# Patient Record
Sex: MALE | Race: WHITE | NOT HISPANIC OR LATINO | Employment: OTHER | URBAN - NONMETROPOLITAN AREA
[De-identification: names, ages, dates, MRNs, and addresses within clinical notes are randomized per-mention and may not be internally consistent; named-entity substitution may affect disease eponyms.]

---

## 2017-01-04 DIAGNOSIS — M10.9 ACUTE GOUT OF MULTIPLE SITES, UNSPECIFIED CAUSE: ICD-10-CM

## 2017-01-05 RX ORDER — MELOXICAM 7.5 MG/1
TABLET ORAL
Qty: 30 TABLET | Refills: 0 | OUTPATIENT
Start: 2017-01-05

## 2017-01-05 RX ORDER — ALLOPURINOL 100 MG/1
TABLET ORAL
Qty: 30 TABLET | Refills: 1 | Status: SHIPPED | OUTPATIENT
Start: 2017-01-05 | End: 2017-03-05 | Stop reason: SDUPTHER

## 2017-01-19 ENCOUNTER — OFFICE VISIT (OUTPATIENT)
Dept: FAMILY MEDICINE CLINIC | Facility: CLINIC | Age: 47
End: 2017-01-19

## 2017-01-19 VITALS
SYSTOLIC BLOOD PRESSURE: 111 MMHG | HEART RATE: 113 BPM | RESPIRATION RATE: 16 BRPM | OXYGEN SATURATION: 94 % | HEIGHT: 71 IN | TEMPERATURE: 98.3 F | BODY MASS INDEX: 25.48 KG/M2 | WEIGHT: 182 LBS | DIASTOLIC BLOOD PRESSURE: 61 MMHG

## 2017-01-19 DIAGNOSIS — I95.1 ORTHOSTATIC HYPOTENSION: ICD-10-CM

## 2017-01-19 DIAGNOSIS — J40 BRONCHITIS: Primary | ICD-10-CM

## 2017-01-19 LAB
ALBUMIN SERPL-MCNC: 4.2 G/DL (ref 3.2–4.8)
ALBUMIN/GLOB SERPL: 1.2 G/DL (ref 1.5–2.5)
ALP SERPL-CCNC: 89 U/L (ref 25–100)
ALT SERPL W P-5'-P-CCNC: 104 U/L (ref 7–40)
ANION GAP SERPL CALCULATED.3IONS-SCNC: 17 MMOL/L (ref 3–11)
AST SERPL-CCNC: 185 U/L (ref 0–33)
BASOPHILS # BLD AUTO: 0.02 10*3/MM3 (ref 0–0.2)
BASOPHILS NFR BLD AUTO: 0.2 % (ref 0–1)
BILIRUB SERPL-MCNC: 0.5 MG/DL (ref 0.3–1.2)
BUN BLD-MCNC: 34 MG/DL (ref 9–23)
BUN/CREAT SERPL: 28.3 (ref 7–25)
CALCIUM SPEC-SCNC: 10 MG/DL (ref 8.7–10.4)
CHLORIDE SERPL-SCNC: 95 MMOL/L (ref 99–109)
CO2 SERPL-SCNC: 27 MMOL/L (ref 20–31)
CREAT BLD-MCNC: 1.2 MG/DL (ref 0.6–1.3)
DEPRECATED RDW RBC AUTO: 45.3 FL (ref 37–54)
EOSINOPHIL # BLD AUTO: 0.25 10*3/MM3 (ref 0.1–0.3)
EOSINOPHIL NFR BLD AUTO: 2 % (ref 0–3)
ERYTHROCYTE [DISTWIDTH] IN BLOOD BY AUTOMATED COUNT: 13.9 % (ref 11.3–14.5)
GFR SERPL CREATININE-BSD FRML MDRD: 65 ML/MIN/1.73
GLOBULIN UR ELPH-MCNC: 3.4 GM/DL
GLUCOSE BLD-MCNC: 92 MG/DL (ref 70–100)
HCT VFR BLD AUTO: 40.2 % (ref 38.9–50.9)
HGB BLD-MCNC: 13.5 G/DL (ref 13.1–17.5)
IMM GRANULOCYTES # BLD: 0.13 10*3/MM3 (ref 0–0.03)
IMM GRANULOCYTES NFR BLD: 1 % (ref 0–0.6)
LYMPHOCYTES # BLD AUTO: 2.07 10*3/MM3 (ref 0.6–4.8)
LYMPHOCYTES NFR BLD AUTO: 16.2 % (ref 24–44)
MCH RBC QN AUTO: 30.5 PG (ref 27–31)
MCHC RBC AUTO-ENTMCNC: 33.6 G/DL (ref 32–36)
MCV RBC AUTO: 90.7 FL (ref 80–99)
MONOCYTES # BLD AUTO: 1.37 10*3/MM3 (ref 0–1)
MONOCYTES NFR BLD AUTO: 10.7 % (ref 0–12)
NEUTROPHILS # BLD AUTO: 8.97 10*3/MM3 (ref 1.5–8.3)
NEUTROPHILS NFR BLD AUTO: 69.9 % (ref 41–71)
PLAT MORPH BLD: NORMAL
PLATELET # BLD AUTO: 371 10*3/MM3 (ref 150–450)
PMV BLD AUTO: 9.5 FL (ref 6–12)
POTASSIUM BLD-SCNC: 4 MMOL/L (ref 3.5–5.5)
PROT SERPL-MCNC: 7.6 G/DL (ref 5.7–8.2)
RBC # BLD AUTO: 4.43 10*6/MM3 (ref 4.2–5.76)
RBC MORPH BLD: NORMAL
SODIUM BLD-SCNC: 139 MMOL/L (ref 132–146)
T4 FREE SERPL-MCNC: 1.48 NG/DL (ref 0.89–1.76)
TSH SERPL DL<=0.05 MIU/L-ACNC: 2.39 MIU/ML (ref 0.35–5.35)
VIT B12 BLD-MCNC: 811 PG/ML (ref 211–911)
WBC MORPH BLD: NORMAL
WBC NRBC COR # BLD: 12.81 10*3/MM3 (ref 3.5–10.8)

## 2017-01-19 PROCEDURE — 82607 VITAMIN B-12: CPT | Performed by: INTERNAL MEDICINE

## 2017-01-19 PROCEDURE — 84439 ASSAY OF FREE THYROXINE: CPT | Performed by: INTERNAL MEDICINE

## 2017-01-19 PROCEDURE — 80050 GENERAL HEALTH PANEL: CPT | Performed by: INTERNAL MEDICINE

## 2017-01-19 PROCEDURE — 85007 BL SMEAR W/DIFF WBC COUNT: CPT | Performed by: INTERNAL MEDICINE

## 2017-01-19 PROCEDURE — 93000 ELECTROCARDIOGRAM COMPLETE: CPT | Performed by: INTERNAL MEDICINE

## 2017-01-19 PROCEDURE — 99214 OFFICE O/P EST MOD 30 MIN: CPT | Performed by: INTERNAL MEDICINE

## 2017-01-19 RX ORDER — DOXYCYCLINE 100 MG/1
100 CAPSULE ORAL 2 TIMES DAILY
Qty: 20 CAPSULE | Refills: 0 | Status: SHIPPED | OUTPATIENT
Start: 2017-01-19 | End: 2017-10-17

## 2017-01-19 NOTE — PROGRESS NOTES
"Subjective   Patient ID: Matthew Santana is a 46 y.o. male Pt is here for management of multiple medical problems.  History of Present Illness  Pt is here for management of multiple medical problems.  Pt is sick. Chest congestion and soa. Started Monday night. Had Doxycycline 4 pills and took till tueday maybe. Pt having problems with hx. States he feels like lights flickering.   Cough with sputum.    Stopped smoking in July of 2016.     Not taking otc meds.       The following portions of the patient's history were reviewed and updated as appropriate: allergies, current medications, past family history, past medical history, past social history, past surgical history and problem list.  Review of Systems   Constitutional: Positive for fatigue.   HENT: Positive for congestion, sinus pressure and sore throat.    Respiratory: Positive for cough, shortness of breath and wheezing.    All other systems reviewed and are negative.      Objective     Visit Vitals   • /61 (BP Location: Right arm, Patient Position: Standing, Cuff Size: Adult)   • Pulse 113   • Temp 98.3 °F (36.8 °C) (Oral)   • Resp 16   • Ht 71\" (180.3 cm)   • Wt 182 lb (82.6 kg)   • SpO2 94%   • BMI 25.38 kg/m2     Physical Exam  General Appearance:    Alert, cooperative, no distress, appears stated age   Head:    Normocephalic, without obvious abnormality, atraumatic   Eyes:    PERRL, conjunctiva/corneas clear, EOM's intact           Ears:    Normal TM's and external ear canals, both ears   Nose:   Nares normal, septum midline, mucosa normal, no drainage    or sinus tenderness   Throat:   Lips, mucosa, and tongue normal; teeth and gums normal   Neck:   Supple, symmetrical, trachea midline, no adenopathy;        thyroid:  No enlargement/tenderness/nodules; no carotid    bruit or JVD   Back:     Symmetric, no curvature, ROM normal, no CVA tenderness   Lungs:     Clear to auscultation bilaterally, respirations unlabored   Chest wall:    No " tenderness or deformity   Heart:    Regular rate and rhythm, S1 and S2 normal, no murmur, rub   or gallop   Abdomen:     Soft, non-tender, bowel sounds active all four quadrants,     no masses, no organomegaly           Extremities:   Extremities normal, atraumatic, no cyanosis or edema   Pulses:   2+ and symmetric all extremities   Skin:   Skin color, texture, turgor normal, no rashes or lesions   Lymph nodes:   Cervical, supraclavicular, and axillary nodes normal   Neurologic:   CNII-XII intact. Normal strength, sensation and reflexes       throughout       Assessment/Plan     Pt was more symptomatic with dizzyness on lying down. ekg tachy. Ortho's suggest orthosatic hypotension.  Obtain labs. Pt wants to hold hospitalization if possible.   Hold lisinopril hctz for today and restart 1/2 pill a day when feeling better.      Pt is now with clarity of thought while asking me to set with ptc. Refuse elías. He will have to take this up with his surg that is working on this.  I will address his acute issue only today as I'm working to keep him alive and out of the hospital.      D/c mobic. Off Eliquis.           ECG 12 Lead  Date/Time: 1/19/2017 9:31 AM  Performed by: RE BAE  Authorized by: RE BAE   Rhythm: sinus tachycardia  Clinical impression: normal ECG              Re was seen today for shortness of breath, cough, nasal congestion, bodyaches and ears congested.    Diagnoses and all orders for this visit:    Bronchitis    Orthostatic hypotension    Other orders  -     ECG 12 Lead    Return in about 1 week (around 1/26/2017).                   There are no Patient Instructions on file for this visit.

## 2017-01-19 NOTE — MR AVS SNAPSHOT
Matthew Santana   1/19/2017 8:00 AM   Office Visit    Dept Phone:  [phone]   Encounter #:  [id number]    Provider:  Matthew Bloom MD   Department:  [facility] PRIMARY CARE                Your Full Care Plan              Today's Medication Changes          These changes are accurate as of: 1/19/17 10:06 AM.  If you have any questions, ask your nurse or doctor.               Stop taking medication(s)listed here:     ELIQUIS 5 MG tablet tablet   Generic drug:  apixaban   Stopped by:  Matthew Bloom MD           meloxicam 7.5 MG tablet   Commonly known as:  MOBIC   Stopped by:  Matthew Bloom MD           oxyCODONE-acetaminophen  MG per tablet   Commonly known as:  PERCOCET   Stopped by:  Matthew Bloom MD           oxyCODONE-acetaminophen 7.5-325 MG per tablet   Commonly known as:  PERCOCET   Stopped by:  Matthew Bloom MD                Where to Get Your Medications      These medications were sent to [address] [phone]  - [phone] [address] [address]     Phone:  [phone]     doxycycline 100 MG capsule                  Your Updated Medication List          This list is accurate as of: 1/19/17 10:06 AM.  Always use your most recent med list.                allopurinol 100 MG tablet   Commonly known as:  ZYLOPRIM   take 1 tablet by mouth once daily       atenolol 25 MG tablet   Commonly known as:  TENORMIN   Take 1 tablet by mouth Daily.       doxycycline 100 MG capsule   Commonly known as:  MONODOX   Take 1 capsule by mouth 2 (Two) Times a Day.       gabapentin 800 MG tablet   Commonly known as:  NEURONTIN   Take 1 tablet by mouth 4 (four) times a day.       HYDROcodone-acetaminophen 7.5-325 MG per tablet   Commonly known as:  NORCO   Take 1 tablet by mouth every 6 (six) hours as needed for moderate pain (4-6).       hydrOXYzine 10 MG tablet   Commonly known as:  ATARAX   take 1 to 2 tablets by mouth at bedtime if needed for insomnia       levETIRAcetam 500 MG tablet   Commonly known as:  KEPPRA   Take 1 tablet by mouth 2 (two) times a day.       lisinopril-hydrochlorothiazide 20-25 MG per tablet   Commonly known as:  PRINZIDE,ZESTORETIC   Take 1 tablet by mouth Daily.       LORazepam 0.5 MG tablet   Commonly known as:  ATIVAN   Take 1 tablet by mouth every 8 (eight) hours as needed (muscle cramp or spasm).       neomycin-bacitracin-polymyxin-hydrocortisone 1 % ophthalmic ointment   Commonly known as:  CORTISPORIN   Administer  to both eyes 4 (four) times a day.       pantoprazole 40 MG EC tablet   Commonly known as:  PROTONIX   take 1 tablet by mouth once daily       RA BALANCED B-50 tablet       RA COL-RITE 100 MG capsule   Generic drug:  docusate sodium       tiZANidine 4 MG tablet   Commonly known as:  ZANAFLEX   Take 1 tablet by mouth Every 6 (Six) Hours As Needed for muscle spasms.       zolpidem 10 MG tablet   Commonly known as:  AMBIEN   Take 1 tablet by mouth at night as needed for sleep.               We Performed the Following     CBC & Differential     CBC Auto Differential     Comprehensive Metabolic Panel     ECG 12 Lead     T4, Free     TSH     Vitamin B12       You Were Diagnosed With        Codes Comments    Bronchitis    -  Primary ICD-10-CM: J40  ICD-9-CM: 490     Orthostatic hypotension     ICD-10-CM: I95.1  ICD-9-CM: 458.0       Medications to be Given to You by a Medical Professional       Instructions     None    Patient Instructions History      Upcoming Appointments     Visit Type Date Time Department    OFFICE VISIT 1/19/2017  8:00 AM [facility]      [employer] Signup     [facility] [employer] allows you to send messages to your doctor, view your test results, renew your prescriptions, schedule appointments, and more. To sign up, go to [employer] and click on the Sign Up Now link  "in the New User? box. Enter your [employer] Activation Code exactly as it appears below along with the last four digits of your Social Security Number and your Date of Birth () to complete the sign-up process. If you do not sign up before the expiration date, you must request a new code.    [employer] Activation Code: [email or web address]  Expires: 2017 10:06 AM    If you have questions, you can email [email or web address] or call [phone] to talk to our [employer] staff. Remember, [employer] is NOT to be used for urgent needs. For medical emergencies, dial 911.               Other Info from Your Visit           Other Notes About Your Plan     I received a phone call from the patient's home health care nurse today on 2016.  She states patient a told her a unit taken 4-5 pain pills as well as 4-5 Neurontin and 45 of his Zanaflex.  She states her discharging him from care secondary to him overdosing on the medications        Allergies     Bactrim [Sulfamethoxazole-trimethoprim]      Levaquin [Levofloxacin]      Other        Reason for Visit     Shortness of Breath difficulty breathing    Cough chest hurts when coughing    Nasal Congestion yellow/greeen drainage    bodyaches all over    Ears congested ears feel full      Vital Signs     Blood Pressure Pulse Temperature Respirations Height Weight    111/61 (BP Location: Right arm, Patient Position: Standing, Cuff Size: Adult) 113 98.3 °F (36.8 °C) (Oral) 16 71\" (180.3 cm) 182 lb (82.6 kg)    Oxygen Saturation Body Mass Index Smoking Status             94% 25.38 kg/m2 Former Smoker         Problems and Diagnoses Noted     Bronchitis    -  Primary    Blood pressure drop upon sitting or standing          Results     ECG 12 Lead               "

## 2017-01-20 DIAGNOSIS — R74.8 ELEVATED LIVER ENZYMES: Primary | ICD-10-CM

## 2017-01-20 NOTE — PROGRESS NOTES
Please let them know the renal function is improved and normal.  Liver enzymes are elevated. Come in for repeat labs and also some will call with getting an u/s of the liver. Get all this done prior to rtc.

## 2017-01-26 ENCOUNTER — TELEPHONE (OUTPATIENT)
Dept: FAMILY MEDICINE CLINIC | Facility: CLINIC | Age: 47
End: 2017-01-26

## 2017-01-26 ENCOUNTER — HOSPITAL ENCOUNTER (OUTPATIENT)
Dept: ULTRASOUND IMAGING | Facility: HOSPITAL | Age: 47
Discharge: HOME | End: 2017-01-26
Attending: INTERNAL MEDICINE | Admitting: INTERNAL MEDICINE

## 2017-01-26 ENCOUNTER — OFFICE VISIT (OUTPATIENT)
Dept: FAMILY MEDICINE CLINIC | Facility: CLINIC | Age: 47
End: 2017-01-26

## 2017-01-26 VITALS
HEART RATE: 92 BPM | RESPIRATION RATE: 16 BRPM | TEMPERATURE: 97.7 F | WEIGHT: 179 LBS | BODY MASS INDEX: 25.06 KG/M2 | HEIGHT: 71 IN | DIASTOLIC BLOOD PRESSURE: 67 MMHG | OXYGEN SATURATION: 99 % | SYSTOLIC BLOOD PRESSURE: 92 MMHG

## 2017-01-26 DIAGNOSIS — R74.8 ELEVATED LIVER ENZYMES: ICD-10-CM

## 2017-01-26 DIAGNOSIS — R74.8 ELEVATED LIVER ENZYMES: Primary | ICD-10-CM

## 2017-01-26 DIAGNOSIS — I95.1 ORTHOSTATIC HYPOTENSION: ICD-10-CM

## 2017-01-26 DIAGNOSIS — J40 BRONCHITIS: ICD-10-CM

## 2017-01-26 LAB
HAV IGM SERPL QL IA: ABNORMAL
HBV CORE IGM SERPL QL IA: ABNORMAL
HBV SURFACE AG SERPL QL IA: ABNORMAL
HCV AB SER DONR QL: REACTIVE
IRON 24H UR-MRATE: 39 MCG/DL (ref 50–175)
IRON SATN MFR SERPL: 12 % (ref 20–50)
TIBC SERPL-MCNC: 316 MCG/DL (ref 250–450)

## 2017-01-26 PROCEDURE — 83550 IRON BINDING TEST: CPT | Performed by: INTERNAL MEDICINE

## 2017-01-26 PROCEDURE — 99213 OFFICE O/P EST LOW 20 MIN: CPT | Performed by: INTERNAL MEDICINE

## 2017-01-26 PROCEDURE — 76705 ECHO EXAM OF ABDOMEN: CPT

## 2017-01-26 PROCEDURE — 83540 ASSAY OF IRON: CPT | Performed by: INTERNAL MEDICINE

## 2017-01-26 PROCEDURE — 87522 HEPATITIS C REVRS TRNSCRPJ: CPT | Performed by: INTERNAL MEDICINE

## 2017-01-26 PROCEDURE — 80074 ACUTE HEPATITIS PANEL: CPT | Performed by: INTERNAL MEDICINE

## 2017-01-26 NOTE — PROGRESS NOTES
"Subjective   Patient ID: Matthew Santana is a 46 y.o. male Pt is here for management of multiple medical problems.  History of Present Illness  Pt is here for management of multiple medical problems.    Pt just taking 1/2 of lisinopril hctz.  Hot and cold chills. Body aching.   Cough and congestion. Feels raw in chest. Poor apitite.       The following portions of the patient's history were reviewed and updated as appropriate: allergies, current medications, past family history, past medical history, past social history, past surgical history and problem list.      Review of Systems   Constitutional: Positive for chills, fatigue and fever.   Respiratory: Positive for cough, shortness of breath and wheezing.    Gastrointestinal: Positive for abdominal pain and nausea.       Objective     Visit Vitals   • BP 92/67 (BP Location: Right arm, Patient Position: Sitting, Cuff Size: Adult)   • Pulse 92   • Temp 97.7 °F (36.5 °C) (Oral)   • Resp 16   • Ht 71\" (180.3 cm)   • Wt 179 lb (81.2 kg)   • SpO2 99%   • BMI 24.97 kg/m2     Physical Exam  General Appearance:    Alert, cooperative, no distress, appears stated age   Head:    Normocephalic, without obvious abnormality, atraumatic   Eyes:    PERRL, conjunctiva/corneas clear, EOM's intact           Ears:    Normal TM's and external ear canals, both ears   Nose:   Nares normal, septum midline, mucosa normal, no drainage    or sinus tenderness   Throat:   Lips, mucosa, and tongue normal; teeth and gums normal   Neck:   Supple, symmetrical, trachea midline, no adenopathy;        thyroid:  No enlargement/tenderness/nodules; no carotid    bruit or JVD   Back:     Symmetric, no curvature, ROM normal, no CVA tenderness   Lungs:     Clear to auscultation bilaterally, respirations unlabored   Chest wall:    No tenderness or deformity   Heart:    Regular rate and rhythm, S1 and S2 normal, no murmur, rub   or gallop   Abdomen:     Soft, non-tender, bowel sounds active all four " quadrants,     no masses, no organomegaly           Extremities:   Left bka.    Pulses:   2+ and symmetric all extremities   Skin:   Skin color, texture, turgor normal, no rashes or lesions   Lymph nodes:   Cervical, supraclavicular, and axillary nodes normal   Neurologic:   CNII-XII intact. Normal strength, sensation and reflexes       throughout       Assessment/Plan   Pt found to high elevated liver enzymes not yet had repeat blood work.  Us done this am.     Hold on lisinopril hctz.         Matthew was seen today for hypotension and bronchitis.    Diagnoses and all orders for this visit:    Elevated liver enzymes    Bronchitis    Orthostatic hypotension    No Follow-up on file.                   There are no Patient Instructions on file for this visit.

## 2017-01-26 NOTE — TELEPHONE ENCOUNTER
[facility] called to notify Dr. Bloom that patient is positive for Hep. C.  They will send confirmation.

## 2017-01-26 NOTE — MR AVS SNAPSHOT
Matthew Santana   1/26/2017 9:15 AM   Office Visit    Dept Phone:  [phone]   Encounter #:  [id number]    Provider:  Matthew Bloom MD   Department:  [facility] PRIMARY CARE                Your Full Care Plan              Today's Medication Changes          These changes are accurate as of: 1/26/17  9:34 AM.  If you have any questions, ask your nurse or doctor.               Stop taking medication(s)listed here:     lisinopril-hydrochlorothiazide 20-25 MG per tablet   Commonly known as:  PRINZIDE,ZESTORETIC   Stopped by:  Matthew Bloom MD                      Your Updated Medication List          This list is accurate as of: 1/26/17  9:34 AM.  Always use your most recent med list.                allopurinol 100 MG tablet   Commonly known as:  ZYLOPRIM   take 1 tablet by mouth once daily       atenolol 25 MG tablet   Commonly known as:  TENORMIN   Take 1 tablet by mouth Daily.       doxycycline 100 MG capsule   Commonly known as:  MONODOX   Take 1 capsule by mouth 2 (Two) Times a Day.       gabapentin 800 MG tablet   Commonly known as:  NEURONTIN   Take 1 tablet by mouth 4 (four) times a day.       HYDROcodone-acetaminophen 7.5-325 MG per tablet   Commonly known as:  NORCO   Take 1 tablet by mouth every 6 (six) hours as needed for moderate pain (4-6).       hydrOXYzine 10 MG tablet   Commonly known as:  ATARAX   take 1 to 2 tablets by mouth at bedtime if needed for insomnia       levETIRAcetam 500 MG tablet   Commonly known as:  KEPPRA   Take 1 tablet by mouth 2 (two) times a day.       LORazepam 0.5 MG tablet   Commonly known as:  ATIVAN   Take 1 tablet by mouth every 8 (eight) hours as needed (muscle cramp or spasm).       neomycin-bacitracin-polymyxin-hydrocortisone 1 % ophthalmic ointment   Commonly known as:  CORTISPORIN   Administer  to both eyes 4 (four) times a day.       pantoprazole 40 MG EC tablet   Commonly known as:  PROTONIX   take 1 tablet by  mouth once daily       RA BALANCED B-50 tablet       RA COL-RITE 100 MG capsule   Generic drug:  docusate sodium       tiZANidine 4 MG tablet   Commonly known as:  ZANAFLEX   Take 1 tablet by mouth Every 6 (Six) Hours As Needed for muscle spasms.       zolpidem 10 MG tablet   Commonly known as:  AMBIEN   Take 1 tablet by mouth at night as needed for sleep.               We Performed the Following     Hepatitis Panel, Acute     Iron Profile       You Were Diagnosed With        Codes Comments    Elevated liver enzymes    -  Primary ICD-10-CM: R74.8  ICD-9-CM: 790.5     Bronchitis     ICD-10-CM: J40  ICD-9-CM: 490     Orthostatic hypotension     ICD-10-CM: I95.1  ICD-9-CM: 458.0       Instructions     None    Patient Instructions History      Upcoming Appointments     Visit Type Date Time Department    OFFICE VISIT 2017  9:15 AM [facility] LIVER 2017  8:00 AM [facility]      [employer] Signup     [facility] allows you to send messages to your doctor, view your test results, renew your prescriptions, schedule appointments, and more. To sign up, go to [employer] and click on the Sign Up Now link in the New User? box. Enter your [facility] Activation Code exactly as it appears below along with the last four digits of your Social Security Number and your Date of Birth () to complete the sign-up process. If you do not sign up before the expiration date, you must request a new code.    [facility] Activation Code: [email or web address]  Expires: 2017 10:06 AM    If you have questions, you can email [email or web address] or call [phone] to talk to our [facility] [employer] staff. Remember, [employer] is NOT to be used for urgent needs. For medical emergencies, dial 911.               Other Info from Your Visit           Other Notes About Your Plan     I received a phone call from the patient's home health care nurse today on 2016.  She states patient a told her a unit taken 4-5  "pain pills as well as 4-5 Neurontin and 45 of his Zanaflex.  She states her discharging him from care secondary to him overdosing on the medications        Allergies     Bactrim [Sulfamethoxazole-trimethoprim]      Levaquin [Levofloxacin]      Other        Reason for Visit     Hypotension 1 week follow-up    Bronchitis 1 week follow-up, chest hurts when coughing      Vital Signs     Blood Pressure Pulse Temperature Respirations Height Weight    92/67 (BP Location: Right arm, Patient Position: Sitting, Cuff Size: Adult) 92 97.7 °F (36.5 °C) (Oral) 16 71\" (180.3 cm) 179 lb (81.2 kg)    Oxygen Saturation Body Mass Index Smoking Status             99% 24.97 kg/m2 Former Smoker         Problems and Diagnoses Noted     Elevated liver enzymes    -  Primary    Bronchitis        Blood pressure drop upon sitting or standing          Results         "

## 2017-01-27 DIAGNOSIS — R76.8 HEPATITIS C ANTIBODY TEST POSITIVE: Primary | ICD-10-CM

## 2017-01-27 NOTE — PROGRESS NOTES
Please let them know the hep c ab is positive and i need another test to see if this is real. I have also set up with dr eduardo and raghav andradecel if neg.

## 2017-01-28 LAB
HCV RNA SERPL NAA+PROBE-ACNC: NORMAL IU/ML
HCV RNA SERPL NAA+PROBE-LOG IU: 5.46 LOG10 IU/ML
TEST INFORMATION: NORMAL

## 2017-02-03 DIAGNOSIS — G47.00 INSOMNIA: ICD-10-CM

## 2017-02-03 RX ORDER — TIZANIDINE 4 MG/1
TABLET ORAL
Qty: 30 TABLET | Refills: 1 | Status: SHIPPED | OUTPATIENT
Start: 2017-02-03 | End: 2017-04-04 | Stop reason: SDUPTHER

## 2017-02-03 RX ORDER — HYDROXYZINE HYDROCHLORIDE 10 MG/1
TABLET, FILM COATED ORAL
Qty: 60 TABLET | Refills: 0 | Status: SHIPPED | OUTPATIENT
Start: 2017-02-03 | End: 2017-03-05 | Stop reason: SDUPTHER

## 2017-02-03 RX ORDER — MELOXICAM 7.5 MG/1
TABLET ORAL
Qty: 30 TABLET | Refills: 0 | Status: SHIPPED | OUTPATIENT
Start: 2017-02-03 | End: 2017-03-05 | Stop reason: SDUPTHER

## 2017-02-23 ENCOUNTER — HOSPITAL ENCOUNTER (EMERGENCY)
Facility: HOSPITAL | Age: 47
Discharge: HOME | End: 2017-02-23
Admitting: PHYSICIAN ASSISTANT

## 2017-02-23 VITALS
OXYGEN SATURATION: 92 % | WEIGHT: 174 LBS | RESPIRATION RATE: 18 BRPM | TEMPERATURE: 99.6 F | DIASTOLIC BLOOD PRESSURE: 99 MMHG | BODY MASS INDEX: 24.91 KG/M2 | HEIGHT: 70 IN | SYSTOLIC BLOOD PRESSURE: 144 MMHG | HEART RATE: 110 BPM

## 2017-02-23 DIAGNOSIS — T40.1X1A HEROIN OVERDOSE, ACCIDENTAL OR UNINTENTIONAL, INITIAL ENCOUNTER (HCC): Primary | ICD-10-CM

## 2017-02-23 PROCEDURE — 99284 EMERGENCY DEPT VISIT MOD MDM: CPT

## 2017-03-05 DIAGNOSIS — M10.9 ACUTE GOUT OF MULTIPLE SITES, UNSPECIFIED CAUSE: ICD-10-CM

## 2017-03-05 DIAGNOSIS — G47.00 INSOMNIA: ICD-10-CM

## 2017-03-06 RX ORDER — MELOXICAM 7.5 MG/1
TABLET ORAL
Qty: 30 TABLET | Refills: 0 | Status: SHIPPED | OUTPATIENT
Start: 2017-03-06 | End: 2017-04-04 | Stop reason: SDUPTHER

## 2017-03-06 RX ORDER — ALLOPURINOL 100 MG/1
TABLET ORAL
Qty: 30 TABLET | Refills: 1 | Status: SHIPPED | OUTPATIENT
Start: 2017-03-06 | End: 2017-05-04 | Stop reason: SDUPTHER

## 2017-03-06 RX ORDER — HYDROXYZINE HYDROCHLORIDE 10 MG/1
TABLET, FILM COATED ORAL
Qty: 60 TABLET | Refills: 0 | Status: SHIPPED | OUTPATIENT
Start: 2017-03-06 | End: 2017-04-04 | Stop reason: SDUPTHER

## 2017-04-04 DIAGNOSIS — G47.00 INSOMNIA: ICD-10-CM

## 2017-04-04 RX ORDER — HYDROXYZINE HYDROCHLORIDE 10 MG/1
TABLET, FILM COATED ORAL
Qty: 60 TABLET | Refills: 0 | Status: SHIPPED | OUTPATIENT
Start: 2017-04-04 | End: 2017-07-03 | Stop reason: SDUPTHER

## 2017-04-04 RX ORDER — TIZANIDINE 4 MG/1
TABLET ORAL
Qty: 30 TABLET | Refills: 1 | Status: SHIPPED | OUTPATIENT
Start: 2017-04-04 | End: 2017-07-03 | Stop reason: SDUPTHER

## 2017-04-04 RX ORDER — MELOXICAM 7.5 MG/1
TABLET ORAL
Qty: 30 TABLET | Refills: 0 | Status: SHIPPED | OUTPATIENT
Start: 2017-04-04 | End: 2017-07-03 | Stop reason: SDUPTHER

## 2017-05-04 DIAGNOSIS — M10.9 ACUTE GOUT OF MULTIPLE SITES, UNSPECIFIED CAUSE: ICD-10-CM

## 2017-05-04 RX ORDER — ALLOPURINOL 100 MG/1
TABLET ORAL
Qty: 30 TABLET | Refills: 1 | Status: SHIPPED | OUTPATIENT
Start: 2017-05-04 | End: 2017-08-02 | Stop reason: SDUPTHER

## 2017-06-03 DIAGNOSIS — G47.00 INSOMNIA: ICD-10-CM

## 2017-06-05 RX ORDER — MELOXICAM 7.5 MG/1
TABLET ORAL
Qty: 30 TABLET | Refills: 0 | OUTPATIENT
Start: 2017-06-05

## 2017-06-05 RX ORDER — HYDROXYZINE HYDROCHLORIDE 10 MG/1
TABLET, FILM COATED ORAL
Qty: 60 TABLET | Refills: 0 | OUTPATIENT
Start: 2017-06-05

## 2017-07-03 DIAGNOSIS — G47.00 INSOMNIA: ICD-10-CM

## 2017-07-03 DIAGNOSIS — M10.9 ACUTE GOUT OF MULTIPLE SITES, UNSPECIFIED CAUSE: ICD-10-CM

## 2017-07-03 RX ORDER — HYDROXYZINE HYDROCHLORIDE 10 MG/1
TABLET, FILM COATED ORAL
Qty: 60 TABLET | Refills: 0 | Status: SHIPPED | OUTPATIENT
Start: 2017-07-03 | End: 2017-08-02 | Stop reason: SDUPTHER

## 2017-07-03 RX ORDER — ALLOPURINOL 100 MG/1
TABLET ORAL
Qty: 30 TABLET | Refills: 1 | OUTPATIENT
Start: 2017-07-03

## 2017-07-03 RX ORDER — TIZANIDINE 4 MG/1
TABLET ORAL
Qty: 30 TABLET | Refills: 1 | Status: SHIPPED | OUTPATIENT
Start: 2017-07-03 | End: 2017-10-31 | Stop reason: SDUPTHER

## 2017-07-03 RX ORDER — MELOXICAM 7.5 MG/1
TABLET ORAL
Qty: 30 TABLET | Refills: 0 | Status: SHIPPED | OUTPATIENT
Start: 2017-07-03 | End: 2017-10-17

## 2017-08-02 DIAGNOSIS — M10.9 ACUTE GOUT OF MULTIPLE SITES, UNSPECIFIED CAUSE: ICD-10-CM

## 2017-08-02 DIAGNOSIS — G47.00 INSOMNIA: ICD-10-CM

## 2017-08-02 RX ORDER — ALLOPURINOL 100 MG/1
TABLET ORAL
Qty: 30 TABLET | Refills: 1 | Status: SHIPPED | OUTPATIENT
Start: 2017-08-02 | End: 2017-10-31 | Stop reason: SDUPTHER

## 2017-08-02 RX ORDER — MELOXICAM 7.5 MG/1
TABLET ORAL
Qty: 30 TABLET | Refills: 0 | Status: SHIPPED | OUTPATIENT
Start: 2017-08-02 | End: 2017-10-17

## 2017-08-02 RX ORDER — HYDROXYZINE HYDROCHLORIDE 10 MG/1
TABLET, FILM COATED ORAL
Qty: 60 TABLET | Refills: 0 | Status: SHIPPED | OUTPATIENT
Start: 2017-08-02 | End: 2017-10-31 | Stop reason: SDUPTHER

## 2017-09-01 DIAGNOSIS — G47.00 INSOMNIA: ICD-10-CM

## 2017-09-01 RX ORDER — HYDROXYZINE HYDROCHLORIDE 10 MG/1
TABLET, FILM COATED ORAL
Qty: 60 TABLET | Refills: 0 | OUTPATIENT
Start: 2017-09-01

## 2017-09-01 RX ORDER — TIZANIDINE 4 MG/1
TABLET ORAL
Qty: 30 TABLET | Refills: 1 | OUTPATIENT
Start: 2017-09-01

## 2017-09-11 RX ORDER — TIZANIDINE 4 MG/1
TABLET ORAL
Qty: 30 TABLET | Refills: 1 | OUTPATIENT
Start: 2017-09-11

## 2017-10-01 DIAGNOSIS — M10.9 ACUTE GOUT OF MULTIPLE SITES, UNSPECIFIED CAUSE: ICD-10-CM

## 2017-10-01 DIAGNOSIS — G47.00 INSOMNIA: ICD-10-CM

## 2017-10-02 RX ORDER — TIZANIDINE 4 MG/1
TABLET ORAL
Qty: 30 TABLET | Refills: 1 | OUTPATIENT
Start: 2017-10-02

## 2017-10-04 RX ORDER — MELOXICAM 7.5 MG/1
TABLET ORAL
Qty: 30 TABLET | Refills: 0 | OUTPATIENT
Start: 2017-10-04

## 2017-10-04 RX ORDER — HYDROXYZINE HYDROCHLORIDE 10 MG/1
TABLET, FILM COATED ORAL
Qty: 60 TABLET | Refills: 0 | OUTPATIENT
Start: 2017-10-04

## 2017-10-04 RX ORDER — ALLOPURINOL 100 MG/1
TABLET ORAL
Qty: 30 TABLET | Refills: 1 | OUTPATIENT
Start: 2017-10-04

## 2017-10-17 ENCOUNTER — OFFICE VISIT (OUTPATIENT)
Dept: FAMILY MEDICINE CLINIC | Facility: CLINIC | Age: 47
End: 2017-10-17

## 2017-10-17 VITALS
DIASTOLIC BLOOD PRESSURE: 80 MMHG | WEIGHT: 193 LBS | BODY MASS INDEX: 29.25 KG/M2 | HEIGHT: 68 IN | HEART RATE: 84 BPM | OXYGEN SATURATION: 98 % | TEMPERATURE: 98.2 F | RESPIRATION RATE: 16 BRPM | SYSTOLIC BLOOD PRESSURE: 128 MMHG

## 2017-10-17 DIAGNOSIS — Z89.512 HX OF BKA, LEFT (HCC): ICD-10-CM

## 2017-10-17 DIAGNOSIS — L03.211 CELLULITIS, FACE: Primary | ICD-10-CM

## 2017-10-17 DIAGNOSIS — B18.2 HEP C W/O COMA, CHRONIC (HCC): ICD-10-CM

## 2017-10-17 PROCEDURE — 99214 OFFICE O/P EST MOD 30 MIN: CPT | Performed by: INTERNAL MEDICINE

## 2017-10-17 RX ORDER — DOXYCYCLINE 100 MG/1
100 CAPSULE ORAL 2 TIMES DAILY
Qty: 20 CAPSULE | Refills: 0 | Status: SHIPPED | OUTPATIENT
Start: 2017-10-17 | End: 2017-10-17 | Stop reason: SDUPTHER

## 2017-10-17 RX ORDER — DOXYCYCLINE 100 MG/1
100 CAPSULE ORAL 2 TIMES DAILY
Qty: 20 CAPSULE | Refills: 0 | Status: SHIPPED | OUTPATIENT
Start: 2017-10-17 | End: 2017-10-27 | Stop reason: SDUPTHER

## 2017-10-17 NOTE — PROGRESS NOTES
Subjective     Patient ID: Matthew Santana is a 47 y.o. male. Patient is here for management of multiple medical problems.     Chief Complaint   Patient presents with   • Edema/Abcess     patient has swelling and several abscesses of the face, jaw and left side of neck   • Prosthetic leg     patient states he needs an order for a new prosthetic leg, order will need to be put in office notes also.   • Medications issues     patient unsure of what  medications he should be taking     History of Present Illness     Pt request new. Current prosthesis is causing pain due to stump shrinking. Pt was told by [facility].  Pt has a hard time getting to [facility] and was told I could order this for him.     C/o of skin infection on face.  All started about a month ago. Newest lession started a few days ago.  Showers daily at night.  Last change of sheets q 2 months.         The following portions of the patient's history were reviewed and updated as appropriate: allergies, current medications, past family history, past medical history, past social history, past surgical history and problem list.    Review of Systems   Constitutional: Negative for fatigue.   HENT: Negative for congestion and sinus pressure.    Skin: Positive for wound.   All other systems reviewed and are negative.      Current Outpatient Prescriptions:   •  allopurinol (ZYLOPRIM) 100 MG tablet, take 1 tablet by mouth once daily, Disp: 30 tablet, Rfl: 1  •  atenolol (TENORMIN) 25 MG tablet, Take 1 tablet by mouth Daily., Disp: 30 tablet, Rfl: 11  •  pantoprazole (PROTONIX) 40 MG EC tablet, take 1 tablet by mouth once daily, Disp: 30 tablet, Rfl: 11  •  tiZANidine (ZANAFLEX) 4 MG tablet, take 1 tablet by mouth every 6 hours if needed for muscle spasm, Disp: 30 tablet, Rfl: 1  •  doxycycline (MONODOX) 100 MG capsule, Take 1 capsule by mouth 2 (Two) Times a Day., Disp: 20 capsule, Rfl: 0  •  hydrOXYzine (ATARAX) 10 MG tablet, take 1 to 2  "tablets by mouth at bedtime if needed for insomnia, Disp: 60 tablet, Rfl: 0  •  silver sulfadiazine (SILVADENE) 1 % cream, Apply topically to affected area(s) 2 (Two) Times a Day., Disp: 100 g, Rfl: 0    Objective      Blood pressure 128/80, pulse 84, temperature 98.2 °F (36.8 °C), temperature source Oral, resp. rate 16, height 68\" (172.7 cm), weight 193 lb (87.5 kg), SpO2 98 %.    Physical Exam     General Appearance:    Alert, cooperative, no distress, appears stated age   Head:    Normocephalic, without obvious abnormality, atraumatic   Eyes:    PERRL, conjunctiva/corneas clear, EOM's intact   Ears:    Normal TM's and external ear canals, both ears   Nose:   Nares normal, septum midline, mucosa normal, no drainage   or sinus tenderness   Throat:   Lips, mucosa, and tongue normal; teeth and gums normal   Neck:   Supple, symmetrical, trachea midline, no adenopathy;        thyroid:  No enlargement/tenderness/nodules; no carotid    bruit or JVD   Back:     Symmetric, no curvature, ROM normal, no CVA tenderness   Lungs:     Clear to auscultation bilaterally, respirations unlabored   Chest wall:    No tenderness or deformity   Heart:    Regular rate and rhythm, S1 and S2 normal, no murmur,        rub or gallop   Abdomen:     Soft, non-tender, bowel sounds active all four quadrants,     no masses, no organomegaly   Extremities:   bka left leg.   Pulses:   2+ and symmetric all extremities   Skin:   Skin color, texture, turgor normal, no rashes or lesions   Lymph nodes:   Cervical, supraclavicular, and axillary nodes normal   Neurologic:   CNII-XII intact. Normal strength, sensation and reflexes       throughout      Results for orders placed or performed in visit on 01/26/17   Hepatitis Panel, Acute   Result Value Ref Range    Hepatitis B Surface Ag Non-Reactive Non-Reactive    Hep A IgM Non-Reactive Non-Reactive    Hep B C IgM Non-Reactive Non-Reactive    Hepatitis C Ab Reactive (C) Non-Reactive   Iron Profile   Result " Value Ref Range    Iron 39 (L) 50 - 175 mcg/dL    TIBC 316 250 - 450 mcg/dL    Iron Saturation 12 (L) 20 - 50 %   HCV RT-PCR,Quant(Non-Graph)   Result Value Ref Range    Hepatitis C Quantitation 195681 IU/mL    HCV log10 5.461 log10 IU/mL    Test Information Comment          Assessment/Plan       Matthew was seen today for edema/abcess, prosthetic leg and medications issues.    Diagnoses and all orders for this visit:    Cellulitis, face  -     Discontinue: doxycycline (MONODOX) 100 MG capsule; Take 1 capsule by mouth 2 (Two) Times a Day.  -     Discontinue: silver sulfadiazine (SILVADENE) 1 % cream; Apply  topically 2 (Two) Times a Day.  -     doxycycline (MONODOX) 100 MG capsule; Take 1 capsule by mouth 2 (Two) Times a Day.  -     silver sulfadiazine (SILVADENE) 1 % cream; Apply topically to affected area(s) 2 (Two) Times a Day.    Hep C w/o coma, chronic  -     Ambulatory Referral to Gastroenterology     of [facility], left    hand wrote order for bka prosthesis.     Return in about 3 months (around 1/17/2018).          There are no Patient Instructions on file for this visit.     Matthew Bloom MD    Assessment/Plan           Matthew was seen today for edema/abcess, prosthetic leg and medications issues.    Diagnoses and all orders for this visit:    Cellulitis, face  -     Discontinue: doxycycline (MONODOX) 100 MG capsule; Take 1 capsule by mouth 2 (Two) Times a Day.  -     Discontinue: silver sulfadiazine (SILVADENE) 1 % cream; Apply  topically 2 (Two) Times a Day.  -     doxycycline (MONODOX) 100 MG capsule; Take 1 capsule by mouth 2 (Two) Times a Day.  -     silver sulfadiazine (SILVADENE) 1 % cream; Apply topically to affected area(s) 2 (Two) Times a Day.    Hep C w/o coma, chronic  -     Ambulatory Referral to Gastroenterology     of [facility], left      Return in about 3 months (around 1/17/2018).                   There are no Patient Instructions on file for this visit.

## 2017-10-27 ENCOUNTER — TELEPHONE (OUTPATIENT)
Dept: FAMILY MEDICINE CLINIC | Facility: CLINIC | Age: 47
End: 2017-10-27

## 2017-10-27 DIAGNOSIS — L03.211 CELLULITIS, FACE: ICD-10-CM

## 2017-10-27 RX ORDER — DOXYCYCLINE 100 MG/1
100 CAPSULE ORAL 2 TIMES DAILY
Qty: 20 CAPSULE | Refills: 0 | Status: SHIPPED | OUTPATIENT
Start: 2017-10-27 | End: 2017-11-15

## 2017-10-31 DIAGNOSIS — M10.9 ACUTE GOUT OF MULTIPLE SITES, UNSPECIFIED CAUSE: ICD-10-CM

## 2017-10-31 DIAGNOSIS — G47.00 INSOMNIA: ICD-10-CM

## 2017-11-01 RX ORDER — MELOXICAM 7.5 MG/1
TABLET ORAL
Qty: 30 TABLET | Refills: 0 | Status: SHIPPED | OUTPATIENT
Start: 2017-11-01 | End: 2017-11-15 | Stop reason: ALTCHOICE

## 2017-11-01 RX ORDER — HYDROXYZINE HYDROCHLORIDE 10 MG/1
TABLET, FILM COATED ORAL
Qty: 60 TABLET | Refills: 0 | Status: SHIPPED | OUTPATIENT
Start: 2017-11-01 | End: 2017-11-15

## 2017-11-01 RX ORDER — TIZANIDINE 4 MG/1
TABLET ORAL
Qty: 30 TABLET | Refills: 1 | Status: SHIPPED | OUTPATIENT
Start: 2017-11-01 | End: 2017-12-30 | Stop reason: SDUPTHER

## 2017-11-01 RX ORDER — ALLOPURINOL 100 MG/1
TABLET ORAL
Qty: 30 TABLET | Refills: 1 | Status: SHIPPED | OUTPATIENT
Start: 2017-11-01 | End: 2017-12-30 | Stop reason: SDUPTHER

## 2017-11-01 RX ORDER — PANTOPRAZOLE SODIUM 40 MG/1
TABLET, DELAYED RELEASE ORAL
Qty: 30 TABLET | Refills: 11 | OUTPATIENT
Start: 2017-11-01 | End: 2023-02-07

## 2017-11-15 ENCOUNTER — OFFICE VISIT (OUTPATIENT)
Dept: GASTROENTEROLOGY | Facility: CLINIC | Age: 47
End: 2017-11-15

## 2017-11-15 ENCOUNTER — LAB (OUTPATIENT)
Dept: LAB | Facility: HOSPITAL | Age: 47
End: 2017-11-15

## 2017-11-15 VITALS
HEART RATE: 94 BPM | HEIGHT: 70 IN | DIASTOLIC BLOOD PRESSURE: 82 MMHG | RESPIRATION RATE: 16 BRPM | TEMPERATURE: 98.4 F | BODY MASS INDEX: 28.06 KG/M2 | SYSTOLIC BLOOD PRESSURE: 116 MMHG | WEIGHT: 196 LBS

## 2017-11-15 DIAGNOSIS — B18.2 CHRONIC HEPATITIS C WITHOUT HEPATIC COMA (HCC): Chronic | ICD-10-CM

## 2017-11-15 DIAGNOSIS — B18.2 CHRONIC HEPATITIS C WITHOUT HEPATIC COMA (HCC): ICD-10-CM

## 2017-11-15 DIAGNOSIS — R79.89 ELEVATED LIVER FUNCTION TESTS: ICD-10-CM

## 2017-11-15 DIAGNOSIS — R79.89 ELEVATED LIVER FUNCTION TESTS: Primary | Chronic | ICD-10-CM

## 2017-11-15 DIAGNOSIS — R12 HEARTBURN: Chronic | ICD-10-CM

## 2017-11-15 PROBLEM — M25.539 PAIN IN WRIST: Status: ACTIVE | Noted: 2017-11-15

## 2017-11-15 PROBLEM — M54.50 LOW BACK PAIN: Status: ACTIVE | Noted: 2017-11-15

## 2017-11-15 PROBLEM — M25.519 SHOULDER PAIN: Status: ACTIVE | Noted: 2017-11-15

## 2017-11-15 PROBLEM — N17.9 ACUTE RENAL FAILURE (HCC): Status: ACTIVE | Noted: 2017-11-15

## 2017-11-15 LAB
ALBUMIN SERPL-MCNC: 4.1 G/DL (ref 3.5–5)
ALBUMIN/GLOB SERPL: 1.1 G/DL (ref 1–2)
ALP SERPL-CCNC: 95 U/L (ref 38–126)
ALT SERPL W P-5'-P-CCNC: 44 U/L (ref 13–69)
ANION GAP SERPL CALCULATED.3IONS-SCNC: 16.3 MMOL/L
AST SERPL-CCNC: 45 U/L (ref 15–46)
BASOPHILS # BLD AUTO: 0.09 10*3/MM3 (ref 0–0.2)
BASOPHILS NFR BLD AUTO: 0.9 % (ref 0–2.5)
BILIRUB SERPL-MCNC: 0.4 MG/DL (ref 0.2–1.3)
BUN BLD-MCNC: 11 MG/DL (ref 7–20)
BUN/CREAT SERPL: 6.9 (ref 6.3–21.9)
CALCIUM SPEC-SCNC: 9.3 MG/DL (ref 8.4–10.2)
CHLORIDE SERPL-SCNC: 97 MMOL/L (ref 98–107)
CO2 SERPL-SCNC: 34 MMOL/L (ref 26–30)
CREAT BLD-MCNC: 1.6 MG/DL (ref 0.6–1.3)
DEPRECATED RDW RBC AUTO: 41.1 FL (ref 37–54)
EOSINOPHIL # BLD AUTO: 0.56 10*3/MM3 (ref 0–0.7)
EOSINOPHIL NFR BLD AUTO: 5.4 % (ref 0–7)
ERYTHROCYTE [DISTWIDTH] IN BLOOD BY AUTOMATED COUNT: 12.2 % (ref 11.5–14.5)
GFR SERPL CREATININE-BSD FRML MDRD: 46 ML/MIN/1.73
GLOBULIN UR ELPH-MCNC: 3.8 GM/DL
GLUCOSE BLD-MCNC: 118 MG/DL (ref 74–98)
HCT VFR BLD AUTO: 42.4 % (ref 42–52)
HGB BLD-MCNC: 14 G/DL (ref 14–18)
IMM GRANULOCYTES # BLD: 0.04 10*3/MM3 (ref 0–0.06)
IMM GRANULOCYTES NFR BLD: 0.4 % (ref 0–0.6)
LYMPHOCYTES # BLD AUTO: 3.44 10*3/MM3 (ref 0.6–3.4)
LYMPHOCYTES NFR BLD AUTO: 33.4 % (ref 10–50)
MCH RBC QN AUTO: 30.5 PG (ref 27–31)
MCHC RBC AUTO-ENTMCNC: 33 G/DL (ref 30–37)
MCV RBC AUTO: 92.4 FL (ref 80–94)
MONOCYTES # BLD AUTO: 0.75 10*3/MM3 (ref 0–0.9)
MONOCYTES NFR BLD AUTO: 7.3 % (ref 0–12)
NEUTROPHILS # BLD AUTO: 5.43 10*3/MM3 (ref 2–6.9)
NEUTROPHILS NFR BLD AUTO: 52.6 % (ref 37–80)
NRBC BLD MANUAL-RTO: 0 /100 WBC (ref 0–0)
PLATELET # BLD AUTO: 302 10*3/MM3 (ref 130–400)
PMV BLD AUTO: 9.5 FL (ref 6–12)
POTASSIUM BLD-SCNC: 3.3 MMOL/L (ref 3.5–5.1)
PROT SERPL-MCNC: 7.9 G/DL (ref 6.3–8.2)
RBC # BLD AUTO: 4.59 10*6/MM3 (ref 4.7–6.1)
SODIUM BLD-SCNC: 144 MMOL/L (ref 137–145)
TSH SERPL DL<=0.05 MIU/L-ACNC: 0.67 MIU/ML (ref 0.47–4.68)
WBC NRBC COR # BLD: 10.31 10*3/MM3 (ref 4.8–10.8)

## 2017-11-15 PROCEDURE — 87340 HEPATITIS B SURFACE AG IA: CPT | Performed by: NURSE PRACTITIONER

## 2017-11-15 PROCEDURE — 86709 HEPATITIS A IGM ANTIBODY: CPT | Performed by: NURSE PRACTITIONER

## 2017-11-15 PROCEDURE — 86704 HEP B CORE ANTIBODY TOTAL: CPT | Performed by: NURSE PRACTITIONER

## 2017-11-15 PROCEDURE — 86705 HEP B CORE ANTIBODY IGM: CPT | Performed by: NURSE PRACTITIONER

## 2017-11-15 PROCEDURE — 86706 HEP B SURFACE ANTIBODY: CPT | Performed by: NURSE PRACTITIONER

## 2017-11-15 PROCEDURE — 87902 NFCT AGT GNTYP ALYS HEP C: CPT | Performed by: NURSE PRACTITIONER

## 2017-11-15 PROCEDURE — 86708 HEPATITIS A ANTIBODY: CPT | Performed by: NURSE PRACTITIONER

## 2017-11-15 PROCEDURE — 36415 COLL VENOUS BLD VENIPUNCTURE: CPT

## 2017-11-15 PROCEDURE — 87522 HEPATITIS C REVRS TRNSCRPJ: CPT | Performed by: NURSE PRACTITIONER

## 2017-11-15 PROCEDURE — 99214 OFFICE O/P EST MOD 30 MIN: CPT | Performed by: NURSE PRACTITIONER

## 2017-11-15 PROCEDURE — 80050 GENERAL HEALTH PANEL: CPT | Performed by: NURSE PRACTITIONER

## 2017-11-15 NOTE — PROGRESS NOTES
Chief Complaint   Patient presents with   • Hepatitis     The patient was diagnosed with hepatitis C in January 2017. The patient has not had work up or evaluation of hepatitis C in the past. There is a history of IVDA in January 2017. The patient has tattoos. No history of blood transfusions.  The patient drinks alcohol socially. His last alcoholic beverage was in January 2017. There is a history of mild depression, the patient had taken Cymbalta without improvement. There is no history of suicidal thoughts or suicide attempts. The patient is single. The patient has children, but they do not live with him. The patient lives alone. The patient does not work, he is on disability.    The patient denies recent change in bowel habits. There is no diarrhea or constipation. There is no history of abdominal pain. There is no history of overt GI bleed (hematemesis melena or hematochezia). The patient denies nausea or vomiting. There is a history of heartburn. The patient may take Pantoprazole 1-2 times per month, he does not take it regularly as he does not have heartburn very often. This controls his heartburn very well when he does take it. The patient denies dysphagia or odynophagia. There is no history of recent significant weight loss. There is no history of liver disease in the past. There is no family history of colon cancer. The patient has not had a colonoscopy in the past.    Hepatitis   This is a chronic problem. Episode onset: January 2017. The problem has been unchanged. Associated symptoms include arthralgias, joint swelling and a rash. Pertinent negatives include no abdominal pain, chest pain, chills, coughing, fatigue, fever, headaches, myalgias, nausea or vomiting. He has tried nothing for the symptoms.   Heartburn   He complains of heartburn. He reports no abdominal pain, no chest pain, no coughing or no nausea. This is a chronic problem. The current episode started more than 1 year ago. The problem occurs  rarely. The problem has been unchanged. The heartburn duration is several minutes. The heartburn is located in the substernum. The heartburn is of mild intensity. The heartburn does not wake him from sleep. Nothing aggravates the symptoms. Pertinent negatives include no fatigue. There are no known risk factors. He has tried a PPI for the symptoms. The treatment provided significant relief.     Review of Systems   Constitutional: Negative for appetite change, chills, fatigue, fever and unexpected weight change.   HENT: Negative for mouth sores, nosebleeds and trouble swallowing.    Eyes: Negative for discharge and redness.   Respiratory: Positive for apnea and shortness of breath. Negative for cough.    Cardiovascular: Positive for leg swelling. Negative for chest pain and palpitations.   Gastrointestinal: Positive for heartburn. Negative for abdominal distention, abdominal pain, anal bleeding, blood in stool, constipation, diarrhea, nausea and vomiting.   Endocrine: Negative for cold intolerance, heat intolerance and polydipsia.   Genitourinary: Negative for dysuria, hematuria and urgency.   Musculoskeletal: Positive for arthralgias, gait problem and joint swelling. Negative for myalgias.   Skin: Positive for rash.   Allergic/Immunologic: Negative for food allergies and immunocompromised state.   Neurological: Negative for dizziness, seizures, syncope and headaches.   Hematological: Negative for adenopathy. Bruises/bleeds easily.   Psychiatric/Behavioral: Negative for dysphoric mood. The patient is nervous/anxious. The patient is not hyperactive.      Patient Active Problem List   Diagnosis   • Benign essential hypertension   • Moderate COPD (chronic obstructive pulmonary disease)   • Edema   • GERD (gastroesophageal reflux disease)   • Gout   • Hypogonadism in male   • Insomnia   • Macrocytosis   • Male erectile disorder of organic origin   • Malnutrition   • Nonepileptic episode   • Peripheral neuropathy   •  Polyarthropathy, multiple sites   • Prediabetes   • Recurrent cold sores   • Seizure disorder   • Tobacco abuse   • Vitamin B6 deficiency   • Anxiety   • Arthritis   • Brain injury   • Coronary artery disease   • Depression   • Radiculopathy   • Syncope   • Ascites   • Cervicalgia   • Status post below knee amputation of left lower extremity   • Acute renal failure   • Low back pain   • Shoulder pain   • Pain in wrist   • Elevated liver function tests   • Chronic hepatitis C without hepatic coma   • Heartburn     Past Medical History:   Diagnosis Date   • Acute renal failure    • Arthritis    • Axillary pain    • Back pain    • COPD (chronic obstructive pulmonary disease)    • Fracture of ankle    • Gout    • Low back pain radiating to both legs    • Shoulder pain    • Sleep apnea    • Tattoo      Past Surgical History:   Procedure Laterality Date   • ANKLE OPEN REDUCTION INTERNAL FIXATION Left 08/02/2016    ORIF   • BELOW KNEE AMPUTATION      Left     Family History   Problem Relation Age of Onset   • Other Other      CARDIAC DISORDER,pulmonary disease   • Lung cancer Other    • Arthritis Other    • Gout Other    • Colon cancer Neg Hx      Social History   Substance Use Topics   • Smoking status: Former Smoker     Quit date: 1/29/2016   • Smokeless tobacco: Never Used   • Alcohol use Yes      Comment: very rare       Current Outpatient Prescriptions:   •  allopurinol (ZYLOPRIM) 100 MG tablet, take 1 tablet by mouth once daily, Disp: 30 tablet, Rfl: 1  •  atenolol (TENORMIN) 25 MG tablet, Take 1 tablet by mouth Daily., Disp: 30 tablet, Rfl: 11  •  pantoprazole (PROTONIX) 40 MG EC tablet, take 1 tablet by mouth once daily, Disp: 30 tablet, Rfl: 11  •  silver sulfadiazine (SILVADENE) 1 % cream, Apply topically to affected area(s) 2 (Two) Times a Day., Disp: 100 g, Rfl: 0  •  tiZANidine (ZANAFLEX) 4 MG tablet, take 1 tablet by mouth every 6 hours if needed for muscle spasm, Disp: 30 tablet, Rfl: 1    Allergies  "  Allergen Reactions   • Bactrim [Sulfamethoxazole-Trimethoprim]    • Levaquin [Levofloxacin]    • Other      /82  Pulse 94  Temp 98.4 °F (36.9 °C)  Resp 16  Ht 70\" (177.8 cm)  Wt 196 lb (88.9 kg)  BMI 28.12 kg/m2    Physical Exam   Constitutional: He is oriented to person, place, and time. He appears well-developed and well-nourished. No distress.   HENT:   Head: Normocephalic and atraumatic.   Right Ear: Hearing and external ear normal.   Left Ear: Hearing and external ear normal.   Nose: Nose normal.   Mouth/Throat: Oropharynx is clear and moist and mucous membranes are normal. Mucous membranes are not pale, not dry and not cyanotic. No oral lesions. No oropharyngeal exudate.   Eyes: Conjunctivae and EOM are normal. Right eye exhibits no discharge. Left eye exhibits no discharge.   Neck: Trachea normal. Neck supple. No JVD present. No edema present. No thyroid mass and no thyromegaly present.   Cardiovascular: Normal rate, regular rhythm, S2 normal and normal heart sounds.  Exam reveals no gallop, no S3 and no friction rub.    No murmur heard.  Pulmonary/Chest: Effort normal and breath sounds normal. No respiratory distress. He exhibits no tenderness.   Abdominal: Normal appearance and bowel sounds are normal. He exhibits no distension, no ascites and no mass. There is no splenomegaly or hepatomegaly. There is no tenderness. There is no rigidity, no rebound and no guarding. No hernia.   Musculoskeletal:        Legs:      Vascular Status -  His exam exhibits no right foot edema. His exam exhibits no left foot edema.  Lymphadenopathy:     He has no cervical adenopathy.        Left: No supraclavicular adenopathy present.   Neurological: He is alert and oriented to person, place, and time. He has normal strength. No cranial nerve deficit or sensory deficit. Gait (walks with cane ) abnormal.   Skin: Rash (on bilateral cheeks and chin) noted. Rash is maculopapular. He is not diaphoretic. No cyanosis. No " "pallor. Nails show no clubbing.   Psychiatric: He has a normal mood and affect.   Nursing note and vitals reviewed.  Stigmata of chronic liver disease:  None.  Asterixis:  None.    Laboratory Results:  Upon review of records:    Dated 1/19/2017 glucose 92 BUN 34 creatinine 1.2 sodium 139 potassium 4.0 chloride 95 CO2 27 calcium 10.0 albumin 4.2   alkaline phosphatase 89 total bilirubin 0.5 WBC 12.81 hemoglobin 13.5 hematocrit 40.2 MCV 90.7 platelet count 371 TSH 2.393 T4 1 0.48 vitamin B12 811 iron count 39 TIBC 316 saturation 12% hepatitis A IgM: Nonreactive, hepatitis B surface antigen: Nonreactive, hepatitis B core IgM: Nonreactive, hepatitis C antibody: Reactive HCV RNA PCR: [id number], 5.461 log 10    Abdominal Imaging:  Upon review of records:    Liver ultrasound dated 1/26/2017 reveals liver is normal in size and echogenicity.  There is no evidence of focal liver mass.  The hepatic vasculature is patent with normal directional flow.  The portal vein measures 8.7 mm.  The gallbladder is unremarkable with no shadowing stones or wall thickening.  The common duct measures 3.1 mm.  Limited images of the right kidney are unremarkable.    Notes:  1. Diagnosed with hepatitis C in January 2017. The patient has not had work up or evaluation of hepatitis C in the past.  2. There is a history of IVDA in January 2017.  3. The patient has tattoos.  4. No history of blood transfusions.  5. The patient drinks alcohol socially. His last alcoholic beverage was in January 2017.  6. There is a history of mild depression, the patient had taken Cymbalta without improvement. There is no history of suicidal thoughts or suicide attempts.  7. The patient is single. The patient has children, but they do not live with him. The patient lives alone. The patient does not work, he is on disability.  8. Discussed work up and possible treatment process of hepatitis C with patient in detail. Patient states he \"will not come that " "often\" for his follow up visits, including frequent follow up after possibly starting treatment. Advised patient that keeping follow up appointments is necessary. Patient verbalizes understanding, but states \"I will come sometimes and I won't come sometimes\".  (noted 11/15/2017)    Assessment and Plan:    Mattehw was seen today for hepatitis.    Diagnoses and all orders for this visit:    Elevated liver function tests  Comments:  Differentials include hepatitis C.  Orders:  -     CBC & Differential; Future  -     Comprehensive Metabolic Panel; Future  -     TSH; Future  -     Hepatitis A Antibody, IgM; Future  -     Hepatitis A Antibody, Total; Future  -     Hepatitis B Core Antibody, IgM; Future  -     Hepatitis B Surface Antibody; Future  -     Hepatitis B Surface Antigen; Future  -     Hepatitis B Core Antibody, Total; Future  -     Hepatitis C RNA, Quantitative, PCR (graph); Future  -     Hepatitis C Genotype; Future    Chronic hepatitis C without hepatic coma  Comments:  HCV RNA PCR: [id number], 5.461 log 10  Orders:  -     CBC & Differential; Future  -     Comprehensive Metabolic Panel; Future  -     TSH; Future  -     Hepatitis A Antibody, IgM; Future  -     Hepatitis A Antibody, Total; Future  -     Hepatitis B Core Antibody, IgM; Future  -     Hepatitis B Surface Antibody; Future  -     Hepatitis B Surface Antigen; Future  -     Hepatitis B Core Antibody, Total; Future  -     Hepatitis C RNA, Quantitative, PCR (graph); Future  -     Hepatitis C Genotype; Future    Heartburn  Comments:  History of intermittent heartburn. Controlled with Pantoprazole PRN.         Plan  and Patient Instructions:  Patient Instructions   1. Antireflux measures: Avoid fried, fatty foods, alcohol, chocolate, coffee, tea,  soft drinks, peppermint and spearmint, spicy foods, tomatoes and tomato based foods, onion based foods, and smoking. Other antireflux measures include weight reduction if overweight, avoiding tight clothing around " the abdomen, elevating the head of the bed 6 inches with blocks under the head board, and don't drink or eat before going to bed and avoid lying down immediately after meals.  2. Pantoprazole 40 mg 1 tablet by mouth in the am 30 minutes before breakfast.  3. Avoid drugs.  4. Avoid alcohol.  5. Tylenol warning.   6. Hepatitis C counseling.   7. Check CBC, CMP, TSH, Hep A panel, Hep B panel, HCV RNA by PCR with reflex to genotype.  8. Possible non-invasive liver work up in the future.  9. Patient may need further evaluation of depression.  10. Follow up 6-8 weeks    Yinka Blackwood, APRN

## 2017-11-15 NOTE — PATIENT INSTRUCTIONS
1. Antireflux measures: Avoid fried, fatty foods, alcohol, chocolate, coffee, tea,  soft drinks, peppermint and spearmint, spicy foods, tomatoes and tomato based foods, onion based foods, and smoking. Other antireflux measures include weight reduction if overweight, avoiding tight clothing around the abdomen, elevating the head of the bed 6 inches with blocks under the head board, and don't drink or eat before going to bed and avoid lying down immediately after meals.  2. Pantoprazole 40 mg 1 tablet by mouth in the am 30 minutes before breakfast.  3. Avoid drugs.  4. Avoid alcohol.  5. Tylenol warning.   6. Hepatitis C counseling.   7. Check CBC, CMP, TSH, Hep A panel, Hep B panel, HCV RNA by PCR with reflex to genotype.  8. Possible non-invasive liver work up in the future.  9. Patient may need further evaluation of depression.  10. Follow up 6-8 weeks

## 2017-11-16 LAB
HAV AB SER QL IA: NEGATIVE
HAV IGM SERPL QL IA: NEGATIVE
HBV CORE AB SER DONR QL IA: NEGATIVE
HBV CORE IGM SERPL QL IA: NEGATIVE
HBV SURFACE AB SER QL: NON REACTIVE
HBV SURFACE AG SERPL QL IA: NEGATIVE

## 2017-11-18 LAB
HCV GENTYP SERPL NAA+PROBE: NORMAL
HCV RNA SERPL NAA+PROBE-ACNC: NORMAL IU/ML
HCV RNA SERPL NAA+PROBE-LOG IU: 6.75 LOG10 IU/ML
Lab: NORMAL
TEST INFORMATION: NORMAL

## 2017-11-30 DIAGNOSIS — G47.00 INSOMNIA: ICD-10-CM

## 2017-11-30 RX ORDER — ATENOLOL 25 MG/1
TABLET ORAL
Qty: 30 TABLET | Refills: 11 | Status: SHIPPED | OUTPATIENT
Start: 2017-11-30 | End: 2018-10-30 | Stop reason: DRUGHIGH

## 2017-11-30 RX ORDER — MELOXICAM 7.5 MG/1
TABLET ORAL
Qty: 30 TABLET | Refills: 0 | Status: SHIPPED | OUTPATIENT
Start: 2017-11-30 | End: 2017-12-19

## 2017-11-30 RX ORDER — LISINOPRIL AND HYDROCHLOROTHIAZIDE 25; 20 MG/1; MG/1
TABLET ORAL
Qty: 30 TABLET | Refills: 11 | Status: SHIPPED | OUTPATIENT
Start: 2017-11-30 | End: 2017-12-19

## 2017-11-30 RX ORDER — HYDROXYZINE HYDROCHLORIDE 10 MG/1
TABLET, FILM COATED ORAL
Qty: 60 TABLET | Refills: 0 | Status: SHIPPED | OUTPATIENT
Start: 2017-11-30 | End: 2017-12-30 | Stop reason: SDUPTHER

## 2017-12-08 DIAGNOSIS — L03.211 CELLULITIS, FACE: ICD-10-CM

## 2017-12-19 ENCOUNTER — OFFICE VISIT (OUTPATIENT)
Dept: GASTROENTEROLOGY | Facility: CLINIC | Age: 47
End: 2017-12-19

## 2017-12-19 ENCOUNTER — LAB (OUTPATIENT)
Dept: LAB | Facility: HOSPITAL | Age: 47
End: 2017-12-19

## 2017-12-19 ENCOUNTER — PREP FOR SURGERY (OUTPATIENT)
Dept: OTHER | Facility: HOSPITAL | Age: 47
End: 2017-12-19

## 2017-12-19 VITALS
HEART RATE: 106 BPM | SYSTOLIC BLOOD PRESSURE: 144 MMHG | BODY MASS INDEX: 29.2 KG/M2 | TEMPERATURE: 98.2 F | DIASTOLIC BLOOD PRESSURE: 84 MMHG | HEIGHT: 70 IN | RESPIRATION RATE: 16 BRPM | WEIGHT: 204 LBS

## 2017-12-19 DIAGNOSIS — B18.2 CHRONIC HEPATITIS C WITHOUT HEPATIC COMA (HCC): Chronic | ICD-10-CM

## 2017-12-19 DIAGNOSIS — R79.89 ELEVATED LIVER FUNCTION TESTS: ICD-10-CM

## 2017-12-19 DIAGNOSIS — R79.89 ELEVATED LIVER FUNCTION TESTS: Chronic | ICD-10-CM

## 2017-12-19 DIAGNOSIS — R12 HEARTBURN: Primary | ICD-10-CM

## 2017-12-19 DIAGNOSIS — B18.2 CHRONIC HEPATITIS C WITHOUT HEPATIC COMA (HCC): ICD-10-CM

## 2017-12-19 DIAGNOSIS — R12 HEARTBURN: Primary | Chronic | ICD-10-CM

## 2017-12-19 LAB
ALBUMIN SERPL-MCNC: 4 G/DL (ref 3.5–5)
ALBUMIN/GLOB SERPL: 1.2 G/DL (ref 1–2)
ALP SERPL-CCNC: 129 U/L (ref 38–126)
ALT SERPL W P-5'-P-CCNC: 52 U/L (ref 13–69)
ANION GAP SERPL CALCULATED.3IONS-SCNC: 13.5 MMOL/L
APTT PPP: 28.4 SECONDS (ref 25–36)
AST SERPL-CCNC: 50 U/L (ref 15–46)
BILIRUB SERPL-MCNC: 0.5 MG/DL (ref 0.2–1.3)
BUN BLD-MCNC: 11 MG/DL (ref 7–20)
BUN/CREAT SERPL: 10 (ref 6.3–21.9)
CALCIUM SPEC-SCNC: 9.6 MG/DL (ref 8.4–10.2)
CHLORIDE SERPL-SCNC: 100 MMOL/L (ref 98–107)
CO2 SERPL-SCNC: 29 MMOL/L (ref 26–30)
CREAT BLD-MCNC: 1.1 MG/DL (ref 0.6–1.3)
FERRITIN SERPL-MCNC: 116 NG/ML (ref 17.9–464)
GFR SERPL CREATININE-BSD FRML MDRD: 71 ML/MIN/1.73
GLOBULIN UR ELPH-MCNC: 3.4 GM/DL
GLUCOSE BLD-MCNC: 205 MG/DL (ref 74–98)
INR PPP: 1 (ref 0.9–1.1)
IRON 24H UR-MRATE: 113 MCG/DL (ref 37–181)
IRON SATN MFR SERPL: 37 % (ref 11–46)
POTASSIUM BLD-SCNC: 4.5 MMOL/L (ref 3.5–5.1)
PROT SERPL-MCNC: 7.4 G/DL (ref 6.3–8.2)
PROTHROMBIN TIME: 11.2 SECONDS (ref 9.3–12.1)
SODIUM BLD-SCNC: 138 MMOL/L (ref 137–145)
TIBC SERPL-MCNC: 308 MCG/DL (ref 261–497)

## 2017-12-19 PROCEDURE — 83540 ASSAY OF IRON: CPT

## 2017-12-19 PROCEDURE — 82728 ASSAY OF FERRITIN: CPT

## 2017-12-19 PROCEDURE — 86038 ANTINUCLEAR ANTIBODIES: CPT

## 2017-12-19 PROCEDURE — 86376 MICROSOMAL ANTIBODY EACH: CPT

## 2017-12-19 PROCEDURE — 82103 ALPHA-1-ANTITRYPSIN TOTAL: CPT

## 2017-12-19 PROCEDURE — 85730 THROMBOPLASTIN TIME PARTIAL: CPT

## 2017-12-19 PROCEDURE — 83550 IRON BINDING TEST: CPT

## 2017-12-19 PROCEDURE — 80053 COMPREHEN METABOLIC PANEL: CPT

## 2017-12-19 PROCEDURE — 82104 ALPHA-1-ANTITRYPSIN PHENO: CPT

## 2017-12-19 PROCEDURE — 36415 COLL VENOUS BLD VENIPUNCTURE: CPT

## 2017-12-19 PROCEDURE — 83516 IMMUNOASSAY NONANTIBODY: CPT

## 2017-12-19 PROCEDURE — 99214 OFFICE O/P EST MOD 30 MIN: CPT | Performed by: NURSE PRACTITIONER

## 2017-12-19 PROCEDURE — 85610 PROTHROMBIN TIME: CPT

## 2017-12-19 RX ORDER — SODIUM CHLORIDE 9 MG/ML
70 INJECTION, SOLUTION INTRAVENOUS CONTINUOUS PRN
Status: CANCELLED | OUTPATIENT
Start: 2017-12-19

## 2017-12-19 RX ORDER — CYCLOBENZAPRINE HCL 10 MG
1 TABLET ORAL 3 TIMES DAILY PRN
Refills: 0 | COMMUNITY
Start: 2017-11-30 | End: 2018-02-01

## 2017-12-19 RX ORDER — HYDROCODONE BITARTRATE AND ACETAMINOPHEN 7.5; 325 MG/1; MG/1
1 TABLET ORAL 3 TIMES DAILY
Refills: 0 | COMMUNITY
Start: 2017-12-04 | End: 2018-02-01

## 2017-12-19 RX ORDER — DULOXETIN HYDROCHLORIDE 30 MG/1
1 CAPSULE, DELAYED RELEASE ORAL DAILY
Refills: 0 | COMMUNITY
Start: 2017-12-04 | End: 2018-12-11

## 2017-12-19 RX ORDER — LIDOCAINE 50 MG/G
OINTMENT TOPICAL
Refills: 0 | COMMUNITY
Start: 2017-12-04 | End: 2018-12-11

## 2017-12-19 NOTE — PROGRESS NOTES
Chief Complaint   Patient presents with   • Follow-up     The patient has a long-standing history of heartburn. The patient is taking Pantoprazole as needed with good control of heartburn. The patient may have heartburn 1-2 times per month. Heartburn is usually mild. If he eats spicy foods, the heartburn can be worse. Heartburn does not wake him at night.    The patient was diagnosed with hepatitis C in January 2017. The patient has not had work up or evaluation of hepatitis C in the past. There is a history of IVDA in January 2017. The patient has tattoos. No history of blood transfusions.  The patient drinks alcohol socially. His last alcoholic beverage was in January 2017. There is a history of mild depression, the patient had taken Cymbalta without improvement. There is no history of suicidal thoughts or suicide attempts. The patient is single. The patient has children, but they do not live with him. The patient lives alone. The patient does not work, he is on disability.     The patient denies recent change in bowel habits. There is no diarrhea or constipation. There is no history of abdominal pain. There is no history of overt GI bleed (hematemesis melena or hematochezia). The patient denies nausea or vomiting. The patient denies dysphagia or odynophagia. There is no history of recent significant weight loss. There is no history of liver disease in the past. There is no family history of colon cancer. The patient has not had a colonoscopy in the past.    Hepatitis   This is a chronic problem. Episode onset: January 2017. The problem has been unchanged. Associated symptoms include arthralgias, myalgias and a rash. Pertinent negatives include no abdominal pain, chest pain, chills, coughing, fatigue, fever, headaches, joint swelling, nausea or vomiting. He has tried nothing for the symptoms.   Heartburn   He complains of heartburn. He reports no abdominal pain, no chest pain, no coughing or no nausea. This is a  chronic problem. The current episode started more than 1 year ago. The problem occurs rarely. The problem has been unchanged. The heartburn duration is several minutes. The heartburn is located in the substernum. The heartburn is of mild intensity. The heartburn does not wake him from sleep. Nothing aggravates the symptoms. Pertinent negatives include no fatigue. There are no known risk factors. He has tried a PPI for the symptoms. The treatment provided significant relief. Past procedures include an EGD (2012).     Review of Systems   Constitutional: Negative for appetite change, chills, fatigue, fever and unexpected weight change.   HENT: Negative for mouth sores, nosebleeds and trouble swallowing.    Eyes: Negative for discharge and redness.   Respiratory: Negative for apnea, cough and shortness of breath.    Cardiovascular: Positive for leg swelling. Negative for chest pain and palpitations.   Gastrointestinal: Positive for heartburn. Negative for abdominal distention, abdominal pain, anal bleeding, blood in stool, constipation, diarrhea, nausea and vomiting.   Endocrine: Positive for polydipsia. Negative for cold intolerance and heat intolerance.   Genitourinary: Negative for dysuria, hematuria and urgency.   Musculoskeletal: Positive for arthralgias and myalgias. Negative for joint swelling.   Skin: Positive for rash.   Allergic/Immunologic: Negative for food allergies and immunocompromised state.   Neurological: Negative for dizziness, seizures, syncope and headaches.   Hematological: Negative for adenopathy. Does not bruise/bleed easily.   Psychiatric/Behavioral: Negative for dysphoric mood. The patient is nervous/anxious. The patient is not hyperactive.      Patient Active Problem List   Diagnosis   • Benign essential hypertension   • Moderate COPD (chronic obstructive pulmonary disease)   • Edema   • GERD (gastroesophageal reflux disease)   • Gout   • Hypogonadism in male   • Insomnia   • Macrocytosis   •  Male erectile disorder of organic origin   • Malnutrition   • Nonepileptic episode   • Peripheral neuropathy   • Polyarthropathy, multiple sites   • Prediabetes   • Recurrent cold sores   • Seizure disorder   • Tobacco abuse   • Vitamin B6 deficiency   • Anxiety   • Arthritis   • Brain injury   • Coronary artery disease   • Depression   • Radiculopathy   • Syncope   • Ascites   • Cervicalgia   • Status post below knee amputation of left lower extremity   • Acute renal failure   • Low back pain   • Shoulder pain   • Pain in wrist   • Elevated liver function tests   • Chronic hepatitis C without hepatic coma   • Heartburn     Past Medical History:   Diagnosis Date   • Acute renal failure    • Arthritis    • Axillary pain    • Back pain    • COPD (chronic obstructive pulmonary disease)    • Fracture of ankle    • Gout    • Low back pain radiating to both legs    • Shoulder pain    • Sleep apnea    • Tattoo      Past Surgical History:   Procedure Laterality Date   • ANKLE OPEN REDUCTION INTERNAL FIXATION Left 08/02/2016    ORIF   • BELOW KNEE AMPUTATION      Left     Family History   Problem Relation Age of Onset   • Other Other      CARDIAC DISORDER,pulmonary disease   • Lung cancer Other    • Arthritis Other    • Gout Other    • Colon cancer Neg Hx    • Cirrhosis Neg Hx    • Liver cancer Neg Hx    • Liver disease Neg Hx    • Esophageal cancer Neg Hx    • Stomach cancer Neg Hx    • Rectal cancer Neg Hx      Social History   Substance Use Topics   • Smoking status: Former Smoker     Start date: 1982     Quit date: 1/29/2016   • Smokeless tobacco: Never Used   • Alcohol use Yes      Comment: very rare       Current Outpatient Prescriptions:   •  allopurinol (ZYLOPRIM) 100 MG tablet, take 1 tablet by mouth once daily, Disp: 30 tablet, Rfl: 1  •  aspirin 81 MG tablet, Take 81 mg by mouth Daily., Disp: , Rfl:   •  atenolol (TENORMIN) 25 MG tablet, take 1 tablet by mouth once daily, Disp: 30 tablet, Rfl: 11  •  hydrOXYzine  "(ATARAX) 10 MG tablet, take 1 to 2 tablets by mouth at bedtime if needed, Disp: 60 tablet, Rfl: 0  •  pantoprazole (PROTONIX) 40 MG EC tablet, take 1 tablet by mouth once daily, Disp: 30 tablet, Rfl: 11  •  silver sulfadiazine (SILVADENE) 1 % cream, Apply topically to affected area(s) 2 (Two) Times a Day., Disp: 100 g, Rfl: 0  •  tiZANidine (ZANAFLEX) 4 MG tablet, take 1 tablet by mouth every 6 hours if needed for muscle spasm, Disp: 30 tablet, Rfl: 1  •  cyclobenzaprine (FLEXERIL) 10 MG tablet, Take 1 tablet by mouth 3 (Three) Times a Day As Needed., Disp: , Rfl: 0  •  DULoxetine (CYMBALTA) 30 MG capsule, Take 1 capsule by mouth Daily., Disp: , Rfl: 0  •  HYDROcodone-acetaminophen (NORCO) 7.5-325 MG per tablet, Take 1 tablet by mouth 3 (Three) Times a Day., Disp: , Rfl: 0  •  lidocaine (XYLOCAINE) 5 % ointment, apply to affected area four times a day, Disp: , Rfl: 0  •  LYRICA 150 MG capsule, Take 1 tablet by mouth 3 (Three) Times a Day., Disp: , Rfl: 0    Allergies   Allergen Reactions   • Levaquin [Levofloxacin] Anaphylaxis   • Bactrim [Sulfamethoxazole-Trimethoprim] Other (See Comments)     Renal failure     • Other      /84  Pulse 106  Temp 98.2 °F (36.8 °C)  Resp 16  Ht 177.8 cm (70\")  Wt 92.5 kg (204 lb)  BMI 29.27 kg/m2    Physical Exam   Constitutional: He is oriented to person, place, and time. He appears well-developed and well-nourished. No distress.   HENT:   Head: Normocephalic and atraumatic.   Right Ear: Hearing and external ear normal.   Left Ear: Hearing and external ear normal.   Nose: Nose normal.   Mouth/Throat: Oropharynx is clear and moist and mucous membranes are normal. Mucous membranes are not pale, not dry and not cyanotic. No oral lesions. No oropharyngeal exudate.   Eyes: Conjunctivae and EOM are normal. Right eye exhibits no discharge. Left eye exhibits no discharge.   Neck: Trachea normal. Neck supple. No JVD present. No edema present. No thyroid mass and no thyromegaly " present.   Cardiovascular: Normal rate, regular rhythm, S2 normal and normal heart sounds.  Exam reveals no gallop, no S3 and no friction rub.    No murmur heard.  Pulmonary/Chest: Effort normal and breath sounds normal. No respiratory distress. He exhibits no tenderness.   Abdominal: Normal appearance and bowel sounds are normal. He exhibits no distension, no ascites and no mass. There is no splenomegaly or hepatomegaly. There is no tenderness. There is no rigidity, no rebound and no guarding. No hernia.   Musculoskeletal:        Legs:      Vascular Status -  His exam exhibits no right foot edema. His exam exhibits no left foot edema.  Lymphadenopathy:     He has no cervical adenopathy.        Left: No supraclavicular adenopathy present.   Neurological: He is alert and oriented to person, place, and time. He has normal strength. No cranial nerve deficit or sensory deficit. Gait (walks with cane ) abnormal.   Skin: No rash noted. He is not diaphoretic. No cyanosis. No pallor. Nails show no clubbing.   Psychiatric: He has a normal mood and affect.   Nursing note and vitals reviewed.  Stigmata of chronic liver disease:  None.  Asterixis:  None.    Laboratory Results:  Upon review of records:     Dated 1/19/2017 glucose 92 BUN 34 creatinine 1.2 sodium 139 potassium 4.0 chloride 95 CO2 27 calcium 10.0 albumin 4.2   alkaline phosphatase 89 total bilirubin 0.5 WBC 12.81 hemoglobin 13.5 hematocrit 40.2 MCV 90.7 platelet count 371 TSH 2.393 T4 1 0.48 vitamin B12 811 iron count 39 TIBC 316 saturation 12% hepatitis A IgM: Nonreactive, hepatitis B surface antigen: Nonreactive, hepatitis B core IgM: Nonreactive, hepatitis C antibody: Reactive HCV RNA PCR: [id number], 5.461 log 10    Dated 11/15/2017 glucose 118 BUN 11 creatinine 1.6 sodium 144 potassium 3.3 chloride 97 CO2 34 calcium 9.3 albumin 4.1 ALT 44 AST 45 alkaline phosphatase 95 total bilirubin 0.4 WBC 10.31 hemoglobin 14.0 hematocrit 42.4 platelet count 302  "MCV 92.4 TSH 0.670 hepatitis A IgM: Negative, hepatitis A total antibody: Negative, hepatitis B core IgM: Negative, hepatitis B surface antibody: Nonreactive, hepatitis be surface antigen: Negative, hepatitis B core total antibody: Negative, HCV RNA quantitative [id number], 6.747 log 10, HCV genotype 1A     Abdominal Imaging:  Upon review of records:     Liver ultrasound dated 1/26/2017 reveals liver is normal in size and echogenicity.  There is no evidence of focal liver mass.  The hepatic vasculature is patent with normal directional flow.  The portal vein measures 8.7 mm.  The gallbladder is unremarkable with no shadowing stones or wall thickening.  The common duct measures 3.1 mm.  Limited images of the right kidney are unremarkable.     Notes:  1. Diagnosed with hepatitis C in January 2017. The patient has not had work up or evaluation of hepatitis C in the past.  2. There is a history of IVDA in January 2017.  3. The patient has tattoos.  4. No history of blood transfusions.  5. The patient drinks alcohol socially. His last alcoholic beverage was in January 2017.  6. There is a history of mild depression, the patient had taken Cymbalta without improvement. There is no history of suicidal thoughts or suicide attempts.  7. The patient is single. The patient has children, but they do not live with him. The patient lives alone. The patient does not work, he is on disability.  8. Discussed work up and possible treatment process of hepatitis C with patient in detail. Patient states he \"will not come that often\" for his follow up visits, including frequent follow up after possibly starting treatment. Advised patient that keeping follow up appointments is necessary. Patient verbalizes understanding, but states \"I will come sometimes and I won't come sometimes\".  (noted 11/15/2017)    Assessment and Plan:    Matthew was seen today for follow-up.    Diagnoses and all orders for this visit:    Heartburn  Comments:  History " of long-standing heartburn. Well controlled with Pantoprazole as needed. Concerns for underlying Lehman's.    Elevated liver function tests  Comments:  Recent labs normal. Differentials include hepatitis C, MURRAY.  Orders:  -     Comprehensive Metabolic Panel; Future  -     Protime-INR; Future  -     aPTT; Future  -     Iron Profile; Future  -     Ferritin; Future  -     CARLOZ With / DsDNA, RNP, Sjogrens A / B, Spence; Future  -     Anti-Smooth Muscle Antibody Titer; Future  -     Anti-microsomal Antibody; Future  -     Mitochondrial Antibodies, M2; Future  -     Alpha - 1 - Antitrypsin Phenotype; Future    Chronic hepatitis C without hepatic coma  Comments:  HCV RNA quantitative [id number], 6.747 log 10, HCV genotype 1A  Orders:  -     Comprehensive Metabolic Panel; Future  -     Protime-INR; Future  -     aPTT; Future  -     Iron Profile; Future  -     Ferritin; Future  -     CARLOZ With / DsDNA, RNP, Sjogrens A / B, Spence; Future  -     Anti-Smooth Muscle Antibody Titer; Future  -     Anti-microsomal Antibody; Future  -     Mitochondrial Antibodies, M2; Future  -     Alpha - 1 - Antitrypsin Phenotype; Future        Plan  and Patient Instructions:  Patient Instructions   1. Avoid drugs.  2. Avoid alcohol.  3. Tylenol warning.  4. Hepatitis C counseling.  5.  Antireflux measures: Avoid fried, fatty foods, alcohol, chocolate, coffee, tea,  soft drinks, peppermint and spearmint, spicy foods, tomatoes and tomato based foods, onion based foods, and smoking. Other antireflux measures include weight reduction if overweight, avoiding tight clothing around the abdomen, elevating the head of the bed 6 inches with blocks under the head board, and don't drink or eat before going to bed and avoid lying down immediately after meals.  6. Pantoprazole 40 mg 1 tablet by mouth in the am 30 minutes before breakfast.  7. Check CBC, CMP, non-invasive liver work up.  8. The patient is not immune to hepatitis A/hepatitis B. The patient may  obtain immunizations through PCP or health department.  9. Upper endoscopy-EGD: Description of the procedure, risks, benefits, alternatives and options, including nonoperative options, were discussed with the patient in detail. The patient understands and wishes to proceed.    Yinka Blackwood, APRN

## 2017-12-19 NOTE — PATIENT INSTRUCTIONS
1. Avoid drugs.  2. Avoid alcohol.  3. Tylenol warning.  4. Hepatitis C counseling.  5.  Antireflux measures: Avoid fried, fatty foods, alcohol, chocolate, coffee, tea,  soft drinks, peppermint and spearmint, spicy foods, tomatoes and tomato based foods, onion based foods, and smoking. Other antireflux measures include weight reduction if overweight, avoiding tight clothing around the abdomen, elevating the head of the bed 6 inches with blocks under the head board, and don't drink or eat before going to bed and avoid lying down immediately after meals.  6. Pantoprazole 40 mg 1 tablet by mouth in the am 30 minutes before breakfast.  7. Check CBC, CMP, non-invasive liver work up.  8. The patient is not immune to hepatitis A/hepatitis B. The patient may obtain immunizations through PCP or health department.  9. Upper endoscopy-EGD: Description of the procedure, risks, benefits, alternatives and options, including nonoperative options, were discussed with the patient in detail. The patient understands and wishes to proceed.

## 2017-12-20 LAB
ALP LIVER CFR SERPL: <1 UNITS (ref 0–20)
ANA SER QL: NEGATIVE
DEPRECATED MITOCHONDRIA M2 IGG SER-ACNC: <20 UNITS (ref 0–20)
SPECIMEN STATUS: NORMAL

## 2017-12-21 LAB — ACTIN IGG SERPL-ACNC: 19 UNITS (ref 0–19)

## 2017-12-26 LAB
A1AT SERPL-MCNC: 143 MG/DL (ref 90–200)
PHENOTYPE: NORMAL

## 2017-12-30 DIAGNOSIS — M10.9 ACUTE GOUT OF MULTIPLE SITES, UNSPECIFIED CAUSE: ICD-10-CM

## 2017-12-30 DIAGNOSIS — G47.00 INSOMNIA: ICD-10-CM

## 2018-01-02 RX ORDER — TIZANIDINE 4 MG/1
TABLET ORAL
Qty: 30 TABLET | Refills: 1 | Status: SHIPPED | OUTPATIENT
Start: 2018-01-02 | End: 2018-02-01

## 2018-01-02 RX ORDER — HYDROXYZINE HYDROCHLORIDE 10 MG/1
TABLET, FILM COATED ORAL
Qty: 60 TABLET | Refills: 0 | Status: SHIPPED | OUTPATIENT
Start: 2018-01-02 | End: 2018-01-29 | Stop reason: SDUPTHER

## 2018-01-02 RX ORDER — MELOXICAM 7.5 MG/1
TABLET ORAL
Qty: 30 TABLET | Refills: 0 | OUTPATIENT
Start: 2018-01-02

## 2018-01-02 RX ORDER — ALLOPURINOL 100 MG/1
TABLET ORAL
Qty: 30 TABLET | Refills: 1 | Status: SHIPPED | OUTPATIENT
Start: 2018-01-02 | End: 2018-02-03 | Stop reason: SDUPTHER

## 2018-01-29 DIAGNOSIS — G47.00 INSOMNIA: ICD-10-CM

## 2018-01-29 RX ORDER — HYDROXYZINE HYDROCHLORIDE 10 MG/1
TABLET, FILM COATED ORAL
Qty: 60 TABLET | Refills: 0 | Status: SHIPPED | OUTPATIENT
Start: 2018-01-29 | End: 2018-02-28 | Stop reason: SDUPTHER

## 2018-01-30 RX ORDER — MELOXICAM 7.5 MG/1
TABLET ORAL
Qty: 30 TABLET | Refills: 0 | OUTPATIENT
Start: 2018-01-30

## 2018-02-01 ENCOUNTER — OFFICE VISIT (OUTPATIENT)
Dept: INTERNAL MEDICINE | Facility: CLINIC | Age: 48
End: 2018-02-01

## 2018-02-01 VITALS — DIASTOLIC BLOOD PRESSURE: 98 MMHG | TEMPERATURE: 98.1 F | SYSTOLIC BLOOD PRESSURE: 148 MMHG | HEART RATE: 105 BPM

## 2018-02-01 DIAGNOSIS — B18.2 CHRONIC HEPATITIS C WITHOUT HEPATIC COMA (HCC): ICD-10-CM

## 2018-02-01 DIAGNOSIS — I10 BENIGN ESSENTIAL HYPERTENSION: Primary | ICD-10-CM

## 2018-02-01 DIAGNOSIS — L03.115 CELLULITIS OF RIGHT LOWER EXTREMITY: ICD-10-CM

## 2018-02-01 DIAGNOSIS — L02.01 CUTANEOUS ABSCESS OF FACE: ICD-10-CM

## 2018-02-01 PROCEDURE — 99214 OFFICE O/P EST MOD 30 MIN: CPT | Performed by: INTERNAL MEDICINE

## 2018-02-01 RX ORDER — TIZANIDINE 4 MG/1
4 TABLET ORAL
Qty: 30 TABLET | Refills: 1
Start: 2018-02-01 | End: 2018-08-24 | Stop reason: SDUPTHER

## 2018-02-01 NOTE — PATIENT INSTRUCTIONS
Pt likely has a DVT in right leg with cellulitis.  Need emergent eval.   Pt instructed to go to er.    Pt declined ambulance ride.

## 2018-02-01 NOTE — PROGRESS NOTES
Subjective     Patient ID: Matthew Santana is a 47 y.o. male. Patient is here for management of multiple medical problems.     No chief complaint on file.    Pt here for htn, b6 def hep c.        History of Present Illness     Pt missed egd.      Having hot flashes. Burring up and getting flu like symptoms. 1.5 days.       Pain in right leg with edema and erythema.   Toe started to hurt 3 days ago.   Fever an chills.       The following portions of the patient's history were reviewed and updated as appropriate: allergies, current medications, past family history, past medical history, past social history, past surgical history and problem list.    Review of Systems   Constitutional: Positive for chills and fever. Negative for fatigue.   HENT: Negative for congestion.    Cardiovascular: Positive for chest pain and leg swelling. Negative for palpitations.   Skin: Positive for rash and wound.   All other systems reviewed and are negative.      Current Outpatient Prescriptions:   •  allopurinol (ZYLOPRIM) 100 MG tablet, take 1 tablet by mouth once daily, Disp: 30 tablet, Rfl: 1  •  aspirin 81 MG tablet, Take 81 mg by mouth Daily., Disp: , Rfl:   •  atenolol (TENORMIN) 25 MG tablet, take 1 tablet by mouth once daily, Disp: 30 tablet, Rfl: 11  •  cyclobenzaprine (FLEXERIL) 10 MG tablet, Take 1 tablet by mouth 3 (Three) Times a Day As Needed., Disp: , Rfl: 0  •  DULoxetine (CYMBALTA) 30 MG capsule, Take 1 capsule by mouth Daily., Disp: , Rfl: 0  •  HYDROcodone-acetaminophen (NORCO) 7.5-325 MG per tablet, Take 1 tablet by mouth 3 (Three) Times a Day., Disp: , Rfl: 0  •  hydrOXYzine (ATARAX) 10 MG tablet, take 1 to 2 tablets by mouth at bedtime if needed, Disp: 60 tablet, Rfl: 0  •  lidocaine (XYLOCAINE) 5 % ointment, apply to affected area four times a day, Disp: , Rfl: 0  •  LYRICA 150 MG capsule, Take 1 tablet by mouth 3 (Three) Times a Day., Disp: , Rfl: 0  •  mupirocin (BACTROBAN) 2 % ointment, Apply  topically  3 (Three) Times a Day., Disp: 30 g, Rfl: 0  •  pantoprazole (PROTONIX) 40 MG EC tablet, take 1 tablet by mouth once daily, Disp: 30 tablet, Rfl: 11  •  silver sulfadiazine (SILVADENE) 1 % cream, Apply topically to affected area(s) 2 (Two) Times a Day., Disp: 100 g, Rfl: 0  •  tiZANidine (ZANAFLEX) 4 MG tablet, take 1 tablet by mouth every 6 hours if needed for muscle spasm, Disp: 30 tablet, Rfl: 1    Objective      There were no vitals taken for this visit.    Physical Exam     General Appearance:    Alert, cooperative, no distress, appears stated age   Head:    Normocephalic, without obvious abnormality, atraumatic   Eyes:    PERRL, conjunctiva/corneas clear, EOM's intact   Ears:    Normal TM's and external ear canals, both ears   Nose:   Nares normal, septum midline, mucosa normal, no drainage   or sinus tenderness   Throat:   Lips, mucosa, and tongue normal; teeth and gums normal   Neck:   Supple, symmetrical, trachea midline, no adenopathy;        thyroid:  No enlargement/tenderness/nodules; no carotid    bruit or JVD   Back:     Symmetric, no curvature, ROM normal, no CVA tenderness   Lungs:     Clear to auscultation bilaterally, respirations unlabored   Chest wall:    No tenderness or deformity   Heart:    Regular rate and rhythm, S1 and S2 normal, no murmur,        rub or gallop   Abdomen:     Soft, non-tender, bowel sounds active all four quadrants,     no masses, no organomegaly   Extremities:   Extremities normal, atraumatic, no cyanosis or edema   Pulses:   2+ and symmetric all extremities   Skin:   Skin color, texture, turgor normal, no rashes or lesions   Lymph nodes:   Cervical, supraclavicular, and axillary nodes normal   Neurologic:   CNII-XII intact. Normal strength, sensation and reflexes       throughout      Results for orders placed or performed in visit on 12/19/17   Comprehensive Metabolic Panel   Result Value Ref Range    Glucose 205 (H) 74 - 98 mg/dL    BUN 11 7 - 20 mg/dL    Creatinine 1.10  0.60 - 1.30 mg/dL    Sodium 138 137 - 145 mmol/L    Potassium 4.5 3.5 - 5.1 mmol/L    Chloride 100 98 - 107 mmol/L    CO2 29.0 26.0 - 30.0 mmol/L    Calcium 9.6 8.4 - 10.2 mg/dL    Total Protein 7.4 6.3 - 8.2 g/dL    Albumin 4.00 3.50 - 5.00 g/dL    ALT (SGPT) 52 13 - 69 U/L    AST (SGOT) 50 (H) 15 - 46 U/L    Alkaline Phosphatase 129 (H) 38 - 126 U/L    Total Bilirubin 0.5 0.2 - 1.3 mg/dL    eGFR Non African Amer 71 >60 mL/min/1.73    Globulin 3.4 gm/dL    A/G Ratio 1.2 1.0 - 2.0 g/dL    BUN/Creatinine Ratio 10.0 6.3 - 21.9    Anion Gap 13.5 mmol/L   Protime-INR   Result Value Ref Range    Protime 11.2 9.3 - 12.1 Seconds    INR 1.00 0.90 - 1.10   aPTT   Result Value Ref Range    PTT 28.4 25.0 - 36.0 seconds   Iron Profile   Result Value Ref Range    Iron 113 37 - 181 mcg/dL    TIBC 308 261 - 497 mcg/dL    Iron Saturation 37 11 - 46 %   Ferritin   Result Value Ref Range    Ferritin 116.00 17.90 - 464.00 ng/mL   CARLOZ With / DsDNA, RNP, Sjogrens A / B, Spence   Result Value Ref Range    CARLOZ Direct Negative Negative   Anti-Smooth Muscle Antibody Titer   Result Value Ref Range    Smooth Muscle Ab 19 0 - 19 Units   Anti-microsomal Antibody   Result Value Ref Range    Liver Fraction: <1.0 0.0 - 20.0 Units   Mitochondrial Antibodies, M2   Result Value Ref Range    Mitochondrial Ab <20.0 0.0 - 20.0 Units   Alpha - 1 - Antitrypsin Phenotype   Result Value Ref Range    A-1 Antitrypsin 143 90 - 200 mg/dL    Phenotype MM    Specimen Status Report   Result Value Ref Range    Specimen Status Comment          Assessment/Plan   Pt will go to er now for lthe leg.  If pt gets out of er today he will rtc in am.        Diagnoses and all orders for this visit:    Benign essential hypertension    Cutaneous abscess of face    Chronic hepatitis C without hepatic coma    Cellulitis of right lower extremity    No Follow-up on file.     risk discussed for transport by personal vehicle. Pt will have his friend drive him to [place].  Pt  declined hospitalized locally.         Patient Instructions   Pt likely has a DVT in right leg with cellulitis.  Need emergent eval.   Pt instructed to go to er.    Pt declined ambulance ride.       Matthew Bloom MD    Assessment/Plan

## 2018-02-03 DIAGNOSIS — M10.9 ACUTE GOUT OF MULTIPLE SITES, UNSPECIFIED CAUSE: ICD-10-CM

## 2018-02-05 DIAGNOSIS — L02.01 CUTANEOUS ABSCESS OF FACE: ICD-10-CM

## 2018-02-12 RX ORDER — ALLOPURINOL 100 MG/1
TABLET ORAL
Qty: 30 TABLET | Refills: 1 | Status: SHIPPED | OUTPATIENT
Start: 2018-02-12 | End: 2018-04-02 | Stop reason: SDUPTHER

## 2018-02-12 RX ORDER — TIZANIDINE 4 MG/1
TABLET ORAL
Qty: 30 TABLET | Refills: 1 | Status: SHIPPED | OUTPATIENT
Start: 2018-02-12 | End: 2018-03-01 | Stop reason: SDUPTHER

## 2018-02-28 DIAGNOSIS — G47.00 INSOMNIA: ICD-10-CM

## 2018-02-28 RX ORDER — HYDROXYZINE HYDROCHLORIDE 10 MG/1
TABLET, FILM COATED ORAL
Qty: 60 TABLET | Refills: 0 | Status: SHIPPED | OUTPATIENT
Start: 2018-02-28 | End: 2018-03-30 | Stop reason: SDUPTHER

## 2018-03-01 RX ORDER — MELOXICAM 7.5 MG/1
TABLET ORAL
Qty: 30 TABLET | Refills: 0 | OUTPATIENT
Start: 2018-03-01

## 2018-03-02 RX ORDER — TIZANIDINE 4 MG/1
TABLET ORAL
Qty: 30 TABLET | Refills: 1 | Status: SHIPPED | OUTPATIENT
Start: 2018-03-02 | End: 2018-05-02 | Stop reason: SDUPTHER

## 2018-03-30 DIAGNOSIS — G47.00 INSOMNIA: ICD-10-CM

## 2018-03-30 RX ORDER — HYDROXYZINE HYDROCHLORIDE 10 MG/1
TABLET, FILM COATED ORAL
Qty: 60 TABLET | Refills: 5 | Status: SHIPPED | OUTPATIENT
Start: 2018-03-30 | End: 2018-12-11

## 2018-04-02 DIAGNOSIS — M10.9 ACUTE GOUT OF MULTIPLE SITES, UNSPECIFIED CAUSE: ICD-10-CM

## 2018-04-02 RX ORDER — ALLOPURINOL 100 MG/1
TABLET ORAL
Qty: 30 TABLET | Refills: 5 | Status: SHIPPED | OUTPATIENT
Start: 2018-04-02

## 2018-05-02 RX ORDER — TIZANIDINE 4 MG/1
TABLET ORAL
Qty: 30 TABLET | Refills: 1 | Status: SHIPPED | OUTPATIENT
Start: 2018-05-02 | End: 2018-07-24 | Stop reason: SDUPTHER

## 2018-06-07 RX ORDER — MELOXICAM 7.5 MG/1
7.5 TABLET ORAL 2 TIMES DAILY
Qty: 60 TABLET | Refills: 11 | OUTPATIENT
Start: 2018-06-07

## 2018-06-07 NOTE — TELEPHONE ENCOUNTER
Richard Zavala DPM 8/16/2016 10:55 AM    I received a phone call from the patient's home health care nurse today on 8/16/2016.  She states patient a told her a unit taken 4-5 pain pills as well as 4-5 Neurontin and 45 of his Zanaflex.  She states her discharging him from care secondary to him overdosing on the medications

## 2018-06-25 RX ORDER — TIZANIDINE 4 MG/1
TABLET ORAL
Qty: 30 TABLET | Refills: 1 | OUTPATIENT
Start: 2018-06-25

## 2018-07-24 RX ORDER — TIZANIDINE 4 MG/1
TABLET ORAL
Qty: 30 TABLET | Refills: 1 | Status: SHIPPED | OUTPATIENT
Start: 2018-07-24 | End: 2018-08-23 | Stop reason: SDUPTHER

## 2018-08-24 RX ORDER — TIZANIDINE 4 MG/1
4 TABLET ORAL
Qty: 30 TABLET | Refills: 1
Start: 2018-08-24 | End: 2021-01-20 | Stop reason: SDUPTHER

## 2018-08-26 DIAGNOSIS — G47.00 INSOMNIA: ICD-10-CM

## 2018-08-27 RX ORDER — HYDROXYZINE HYDROCHLORIDE 10 MG/1
TABLET, FILM COATED ORAL
Qty: 60 TABLET | Refills: 5 | OUTPATIENT
Start: 2018-08-27

## 2018-08-27 RX ORDER — TIZANIDINE 4 MG/1
TABLET ORAL
Qty: 30 TABLET | Refills: 1 | OUTPATIENT
Start: 2018-08-27 | End: 2023-02-07

## 2018-08-27 NOTE — TELEPHONE ENCOUNTER
Call the pharmacy and d/c the med and have the parmacy change the provider name to the current pcp.

## 2018-08-30 ENCOUNTER — TELEPHONE (OUTPATIENT)
Dept: INTERNAL MEDICINE | Facility: CLINIC | Age: 48
End: 2018-08-30

## 2018-08-30 NOTE — TELEPHONE ENCOUNTER
[facility], [place] [facility] pharmacy notified to discontinue medication.

## 2018-09-22 DIAGNOSIS — G47.00 INSOMNIA: ICD-10-CM

## 2018-09-24 RX ORDER — HYDROXYZINE HYDROCHLORIDE 10 MG/1
TABLET, FILM COATED ORAL
Qty: 60 TABLET | Refills: 5 | OUTPATIENT
Start: 2018-09-24

## 2018-09-25 DIAGNOSIS — M10.9 ACUTE GOUT OF MULTIPLE SITES, UNSPECIFIED CAUSE: ICD-10-CM

## 2018-09-26 RX ORDER — PANTOPRAZOLE SODIUM 40 MG/1
TABLET, DELAYED RELEASE ORAL
Qty: 30 TABLET | Refills: 11 | OUTPATIENT
Start: 2018-09-26

## 2018-09-26 RX ORDER — ALLOPURINOL 100 MG/1
TABLET ORAL
Qty: 30 TABLET | Refills: 5 | OUTPATIENT
Start: 2018-09-26

## 2018-10-30 ENCOUNTER — OFFICE VISIT (OUTPATIENT)
Dept: NEUROSURGERY | Facility: CLINIC | Age: 48
End: 2018-10-30

## 2018-10-30 VITALS — HEIGHT: 70 IN

## 2018-10-30 DIAGNOSIS — M50.30 DEGENERATIVE CERVICAL DISC: Primary | ICD-10-CM

## 2018-10-30 DIAGNOSIS — M50.30 DDD (DEGENERATIVE DISC DISEASE), CERVICAL: ICD-10-CM

## 2018-10-30 DIAGNOSIS — M50.30 BULGING OF CERVICAL INTERVERTEBRAL DISC: ICD-10-CM

## 2018-10-30 PROCEDURE — 99243 OFF/OP CNSLTJ NEW/EST LOW 30: CPT | Performed by: NEUROLOGICAL SURGERY

## 2018-10-30 RX ORDER — RAMIPRIL 10 MG/1
10 CAPSULE ORAL DAILY
Refills: 0 | COMMUNITY
Start: 2018-10-01

## 2018-10-30 RX ORDER — PSYLLIUM HUSK 3.4 G/7G
1 POWDER ORAL DAILY
Refills: 0 | COMMUNITY
Start: 2018-10-25 | End: 2020-01-27

## 2018-10-30 RX ORDER — EMPAGLIFLOZIN 25 MG/1
TABLET, FILM COATED ORAL
Refills: 0 | COMMUNITY
Start: 2018-10-02 | End: 2020-01-27

## 2018-10-30 RX ORDER — LISINOPRIL AND HYDROCHLOROTHIAZIDE 25; 20 MG/1; MG/1
TABLET ORAL
Refills: 1 | COMMUNITY
Start: 2018-09-24 | End: 2018-10-30 | Stop reason: ALTCHOICE

## 2018-10-30 RX ORDER — HEPATITIS A VACCINE, INACTIVATED 50 [IU]/ML
INJECTION, SUSPENSION INTRAMUSCULAR
Refills: 0 | COMMUNITY
Start: 2018-10-26 | End: 2018-12-11

## 2018-10-30 RX ORDER — ATENOLOL 50 MG/1
50 TABLET ORAL DAILY
Refills: 0 | COMMUNITY
Start: 2018-10-24

## 2018-10-30 RX ORDER — ROSUVASTATIN CALCIUM 10 MG/1
10 TABLET, COATED ORAL DAILY
Refills: 0 | COMMUNITY
Start: 2018-10-01
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
## 2019-03-19 ENCOUNTER — TRANSCRIBE ORDERS (OUTPATIENT)
Dept: PHYSICAL THERAPY | Facility: CLINIC | Age: 49
End: 2019-03-19

## 2019-03-19 DIAGNOSIS — M47.812 CERVICAL SPONDYLOSIS WITHOUT MYELOPATHY: ICD-10-CM

## 2019-03-19 DIAGNOSIS — M47.816 LUMBAR SPONDYLOSIS: Primary | ICD-10-CM

## 2019-03-19 DIAGNOSIS — G89.4 CHRONIC PAIN SYNDROME: ICD-10-CM

## 2019-03-25 ENCOUNTER — TREATMENT (OUTPATIENT)
Dept: PHYSICAL THERAPY | Facility: CLINIC | Age: 49
End: 2019-03-25

## 2019-03-25 DIAGNOSIS — G89.29 CHRONIC RIGHT SHOULDER PAIN: Primary | ICD-10-CM

## 2019-03-25 DIAGNOSIS — M54.2 PAIN, NECK: ICD-10-CM

## 2019-03-25 DIAGNOSIS — M25.511 CHRONIC RIGHT SHOULDER PAIN: Primary | ICD-10-CM

## 2019-03-25 PROCEDURE — 97162 PT EVAL MOD COMPLEX 30 MIN: CPT | Performed by: PHYSICAL THERAPIST
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
## 2019-04-08 ENCOUNTER — TREATMENT (OUTPATIENT)
Dept: PHYSICAL THERAPY | Facility: CLINIC | Age: 49
End: 2019-04-08

## 2019-04-08 DIAGNOSIS — M25.511 CHRONIC RIGHT SHOULDER PAIN: Primary | ICD-10-CM

## 2019-04-08 DIAGNOSIS — G89.29 CHRONIC RIGHT SHOULDER PAIN: Primary | ICD-10-CM

## 2019-04-08 PROCEDURE — 97140 MANUAL THERAPY 1/> REGIONS: CPT | Performed by: PHYSICAL THERAPIST

## 2019-04-08 PROCEDURE — 97110 THERAPEUTIC EXERCISES: CPT | Performed by: PHYSICAL THERAPIST

## 2019-04-08 NOTE — PROGRESS NOTES
"Physical Therapy Daily Progress Note        Matthew Santana reports 7/10 pain today at rest.  Pt reports the R neck and shoulder have been \"killing him\" during the night and the day. Pt reports he took a pain pill about an hr and a half prior to PT session. Pt reports that his R UT feels very tight.         Objective Pt present to PT today with no distress at rest.     Pt had some discomfort in the R shoulder and UT with manual and independent exercises today.     Pt with very limited ROM in shoulder with ER most limited.       See Exercise, Manual, and Modality Logs for complete treatment.     Assessment/Plan  Pt has limited R shoulder motion in all motions with ER most limited and decreased cervical ROM due to R UT pain. Pt would benefit from PT to help decrease pain and stiffness in R cervical spine and R shoulder in order to increase mobility and function with daily activities.       Progress per Plan of Care  Progress as tolerated            Manual Therapy:    11     mins  28259;  Therapeutic Exercise:    12     mins  07739;     Neuromuscular Los:        mins  13179;    Therapeutic Activity:          mins  56893;     Gait Training:        ___  mins  80953;     Ultrasound:          mins  25537;    Electrical Stimulation:         mins  25966 ( );  Dry Needling          mins self-pay    Timed Treatment:   23   mins   Total Treatment:     48   mins    Aramis Sam, PT  Physical Therapist        "

## 2019-04-10 ENCOUNTER — TREATMENT (OUTPATIENT)
Dept: PHYSICAL THERAPY | Facility: CLINIC | Age: 49
End: 2019-04-10

## 2019-04-10 DIAGNOSIS — M54.2 PAIN, NECK: ICD-10-CM

## 2019-04-10 DIAGNOSIS — G89.29 CHRONIC RIGHT SHOULDER PAIN: Primary | ICD-10-CM

## 2019-04-10 DIAGNOSIS — M25.511 CHRONIC RIGHT SHOULDER PAIN: Primary | ICD-10-CM

## 2019-04-10 PROCEDURE — 97112 NEUROMUSCULAR REEDUCATION: CPT | Performed by: PHYSICAL THERAPIST

## 2019-04-10 PROCEDURE — 97140 MANUAL THERAPY 1/> REGIONS: CPT | Performed by: PHYSICAL THERAPIST

## 2019-04-10 NOTE — PROGRESS NOTES
Physical Therapy Daily Progress Note        Matthew Santana reports 7/10 pain today at rest.  Pt reports being very sore following last visit but soreness has decreased over time. Pt reports working a lot with the theraband at home.         Objective Pt present to PT today with no distress at rest.     Pt tolerated exercises well today although has a lot of pain with shoulder movement both active and passive.       See Exercise, Manual, and Modality Logs for complete treatment.     Assessment/Plan  Pt with greatly limited abduction and ER of shoulder. Pt with signs and symptoms that are consistent with frozen shoulder. Pt would benefit from PT to help increase R shoulder mobility and function to help decrease L shoulder pain.       Progress per Plan of Care  Progress as tolerated           Manual Therapy:    24     mins  90304;  Therapeutic Exercise:         mins  45560;     Neuromuscular Los:    10    mins  04952;    Therapeutic Activity:          mins  32645;     Gait Training:        ___  mins  63249;     Ultrasound:          mins  33295;    Electrical Stimulation:         mins  64684 ( );  Dry Needling          mins self-pay    Timed Treatment:   34   mins   Total Treatment:     57   mins    Aramis Sam, PT  Physical Therapist

## 2019-04-25 ENCOUNTER — TREATMENT (OUTPATIENT)
Dept: PHYSICAL THERAPY | Facility: CLINIC | Age: 49
End: 2019-04-25

## 2019-04-25 DIAGNOSIS — M54.2 PAIN, NECK: ICD-10-CM

## 2019-04-25 DIAGNOSIS — M25.511 CHRONIC RIGHT SHOULDER PAIN: Primary | ICD-10-CM

## 2019-04-25 DIAGNOSIS — G89.29 CHRONIC RIGHT SHOULDER PAIN: Primary | ICD-10-CM

## 2019-04-25 PROCEDURE — 97530 THERAPEUTIC ACTIVITIES: CPT | Performed by: PHYSICAL THERAPIST

## 2019-04-25 PROCEDURE — 97110 THERAPEUTIC EXERCISES: CPT | Performed by: PHYSICAL THERAPIST

## 2019-04-25 NOTE — PROGRESS NOTES
Physical Therapy Daily Progress Note        Matthew Santana reports 7-8/10 pain today at rest.  Pt reports that he has not noticed much of a difference and has the same amount of pain which is constant. Pt reports that the pain is worst in the morning and decreases once he moves around a little bit then becomes worse at the end of the day.         Objective       Active Range of Motion   Cervical/Thoracic Spine   Cervical    Flexion: 70 degrees   Extension: 44 degrees   Left lateral flexion: 40 degrees   Right lateral flexion: 26 degrees   Left rotation: 76 degrees   Right rotation: 63 degrees     Right Shoulder   Flexion: 109 degrees   Abduction: 54 degrees     Passive Range of Motion     Right Shoulder   Flexion: 117 degrees   Abduction: 87 degrees   External rotation 45°: 7 degrees   Internal rotation 45°: 39 degrees    Pt present to PT today with no distress at rest.     Pt had pain with shoulder motion passively at end range and has decrease ROM in shoulder in capsular pattern.       See Exercise, Manual, and Modality Logs for complete treatment.     Assessment/Plan  Pt continues to have decreased shoulder ROM and appears to have adhesive capsulitis secondary to past trauma and due to history of diabetes. Pt would benefit from PT to help increase shoulder function and movement in order to improve functional mobility and decrease pain.       Progress per Plan of Care  Await Re-cert           Manual Therapy:         mins  07268;  Therapeutic Exercise:    15     mins  33710;     Neuromuscular Los:        mins  94049;    Therapeutic Activity:     9     mins  77256;     Gait Training:        ___  mins  24234;     Ultrasound:          mins  37836;    Electrical Stimulation:         mins  63761 ( );  Dry Needling          mins self-pay    Timed Treatment:   24   mins   Total Treatment:     48   mins    Aramis Sam, PT  Physical Therapist

## 2019-05-10 ENCOUNTER — TREATMENT (OUTPATIENT)
Dept: PHYSICAL THERAPY | Facility: CLINIC | Age: 49
End: 2019-05-10

## 2019-05-10 DIAGNOSIS — M25.511 CHRONIC RIGHT SHOULDER PAIN: Primary | ICD-10-CM

## 2019-05-10 DIAGNOSIS — G89.29 CHRONIC RIGHT SHOULDER PAIN: Primary | ICD-10-CM

## 2019-05-10 DIAGNOSIS — M54.2 PAIN, NECK: ICD-10-CM

## 2019-05-10 PROCEDURE — PTNOCHG PR CUSTOM PT NO CHARGE VISIT: Performed by: PHYSICAL THERAPIST

## 2019-05-10 NOTE — PROGRESS NOTES
Physical Therapy Daily Progress Note        Matthew Santana reports 7-8/10 pain today at rest.  Pt reports that his R UT and shoulder have been hurting constantly. Pt states that it has been flaring up on him since beginning PT.         Objective Pt present to PT today with no distress at rest.     Pt continued to have pain in the R shoulder and UT with mobilization and exercises today.         See Exercise, Manual, and Modality Logs for complete treatment.     Assessment/Plan  Pt continues to present with symptoms of adhesive capsulitis in the R shoulder. Pt would benefit from PT to help improve shoulder mobility, strength, and activity tolerance.       Progress per Plan of Care  Progress mobility exercise.           Manual Therapy:         mins  11917;  Therapeutic Exercise:         mins  11454;     Neuromuscular Los:        mins  27977;    Therapeutic Activity:          mins  86013;     Gait Training:        ___  mins  22698;     Ultrasound:          mins  10639;    Electrical Stimulation:         mins  65986 ( );  Dry Needling          mins self-pay    Timed Treatment:      mins   Total Treatment:     43   mins    Aramis Sam, PT  Physical Therapist

## 2019-05-14 ENCOUNTER — TREATMENT (OUTPATIENT)
Dept: PHYSICAL THERAPY | Facility: CLINIC | Age: 49
End: 2019-05-14

## 2019-05-14 DIAGNOSIS — M25.511 CHRONIC RIGHT SHOULDER PAIN: Primary | ICD-10-CM

## 2019-05-14 DIAGNOSIS — M54.2 PAIN, NECK: ICD-10-CM

## 2019-05-14 DIAGNOSIS — G89.29 CHRONIC RIGHT SHOULDER PAIN: Primary | ICD-10-CM

## 2019-05-14 PROCEDURE — 97140 MANUAL THERAPY 1/> REGIONS: CPT | Performed by: PHYSICAL THERAPIST

## 2019-05-14 PROCEDURE — 97110 THERAPEUTIC EXERCISES: CPT | Performed by: PHYSICAL THERAPIST

## 2019-05-14 NOTE — PROGRESS NOTES
Physical Therapy Daily Progress Note        Matthew Santana reports 8/10 pain today at rest.  Pt reports that his shoulder has been flared up since last visit with the worst pain in the R UT down into the deltoid. Pt reports that the pain is killing him.         Objective Pt present to PT today with no distress at rest.     Pt shoulder motion limited in capsular pattern of ER>abd>IR.     Pt shoulder ached following manual therapy and only comfortable exercises were performed following manual.       See Exercise, Manual, and Modality Logs for complete treatment.     Assessment/Plan  Pt continues to display signs of adhesive capsulitis of the R shoulder. Pt not tolerating manual therapy well required to gain motion of shoulder and has a lot of soreness and pain following sessions. Pt would benefit from PT to help improve shoulder motion and function.       Progress per Plan of Care  Progress as tolerated           Manual Therapy:    24     mins  23662;  Therapeutic Exercise:    10     mins  63686;     Neuromuscular Los:        mins  51848;    Therapeutic Activity:          mins  62703;     Gait Training:        ___  mins  21111;     Ultrasound:          mins  41482;    Electrical Stimulation:         mins  30266 ( );  Dry Needling          mins self-pay    Timed Treatment:   34   mins   Total Treatment:     50   mins    Aramis Sam, PT  Physical Therapist

## 2019-05-29 ENCOUNTER — TREATMENT (OUTPATIENT)
Dept: PHYSICAL THERAPY | Facility: CLINIC | Age: 49
End: 2019-05-29

## 2019-05-29 DIAGNOSIS — M25.511 CHRONIC RIGHT SHOULDER PAIN: Primary | ICD-10-CM

## 2019-05-29 DIAGNOSIS — M75.01 ADHESIVE CAPSULITIS OF RIGHT SHOULDER: ICD-10-CM

## 2019-05-29 DIAGNOSIS — G89.29 CHRONIC RIGHT SHOULDER PAIN: Primary | ICD-10-CM

## 2019-05-29 PROCEDURE — 97530 THERAPEUTIC ACTIVITIES: CPT | Performed by: PHYSICAL THERAPIST

## 2019-05-29 PROCEDURE — 97140 MANUAL THERAPY 1/> REGIONS: CPT | Performed by: PHYSICAL THERAPIST

## 2019-05-29 NOTE — PROGRESS NOTES
Physical Therapy Daily Progress Note        Matthew Santana reports 7/10 pain today at rest.  Pt reports that his shoulder has seemed to have hurt worse since beginning PT. Pt that he is not able to sleep on his right side which has really limited his sleep schedule.          Objective       Passive Range of Motion     Right Shoulder   Flexion: 118 degrees   Abduction: 93 degrees   External rotation 0°: 9 degrees   Internal rotation 45°: 49 degrees    Pt present to PT today with no distress at rest.     Pt had pain with end range motions today but tolerated manual therapy better today.       See Exercise, Manual, and Modality Logs for complete treatment.     Assessment/Plan  Pt has not improved much with comfortable exercise and manual therapy. Pt attendance and tolerance to PT have not been great in order to make progress with adhesive capsulitis diagnosis. Pt and PT discussed what treatment and attendance was necessary in order to help pt and improve shoulder pain and mobility. Pt agreed to try more aggressive approach that PT has done in the past which has been successful. Pt would benefit from PT to help improve shoulder ROM and function and decrease guarding and pain.       Progress per Plan of Care  Await re-cert           Manual Therapy:    15     mins  62216;  Therapeutic Exercise:         mins  41531;     Neuromuscular Los:        mins  53686;    Therapeutic Activity:     10     mins  09853;     Gait Training:        ___  mins  61114;     Ultrasound:          mins  75043;    Electrical Stimulation:         mins  34033 ( );  Dry Needling          mins self-pay    Timed Treatment:   25   mins   Total Treatment:     64   mins    Aramis Sma, PT  Physical Therapist

## 2019-06-26 ENCOUNTER — TREATMENT (OUTPATIENT)
Dept: PHYSICAL THERAPY | Facility: CLINIC | Age: 49
End: 2019-06-26

## 2019-06-26 DIAGNOSIS — M25.511 CHRONIC RIGHT SHOULDER PAIN: Primary | ICD-10-CM

## 2019-06-26 DIAGNOSIS — M54.2 PAIN, NECK: ICD-10-CM

## 2019-06-26 DIAGNOSIS — G89.29 CHRONIC RIGHT SHOULDER PAIN: Primary | ICD-10-CM

## 2019-06-26 DIAGNOSIS — M75.01 ADHESIVE CAPSULITIS OF RIGHT SHOULDER: ICD-10-CM

## 2019-06-26 PROCEDURE — 97110 THERAPEUTIC EXERCISES: CPT | Performed by: PHYSICAL THERAPIST

## 2019-06-26 NOTE — PROGRESS NOTES
Physical Therapy Daily Progress Note        Matthew Santana reports 5/10 pain today at rest.  Pt reports that the shoulder feels better since taking a break from PT but still has pain on top of the shoulder and shoulder blade. Pt reports that he wants to continue PT because despite having pain when he is mobilizing the shoulder, it seems to help.         Objective       Passive Range of Motion     Right Shoulder   Flexion: 126 degrees   Abduction: 89 degrees   External rotation 45°: 14 degrees   Internal rotation 45°: 36 degrees    Pt present to PT today with no distress at rest.     Pt with no significant difference in ROM since last visit.     Pt with pain at end ranges with all motions.       See Exercise, Manual, and Modality Logs for complete treatment.     Assessment/Plan  Pt continues to display signs and symptoms of adhesive capsulitis in the R shoulder. Pt with decreased accessory motions with shoulder mobilizations. Pt abduction and ER very limited with both active and passive ROM. Pt would benefit from PT to help improve shoulder motion and function to allow for greater pain free daily activities.       Progress per Plan of Care  Await Re-cert           Manual Therapy:         mins  60907;  Therapeutic Exercise:    12     mins  35438;     Neuromuscular Los:        mins  24699;    Therapeutic Activity:          mins  64548;     Gait Training:        ___  mins  35254;     Ultrasound:          mins  57946;    Electrical Stimulation:         mins  74207 ( );  Dry Needling          mins self-pay    Timed Treatment:   12   mins   Total Treatment:     63   mins    Aramis Sam, PT  Physical Therapist

## 2019-07-10 ENCOUNTER — APPOINTMENT (OUTPATIENT)
Dept: GENERAL RADIOLOGY | Facility: HOSPITAL | Age: 49
End: 2019-07-10

## 2019-07-10 ENCOUNTER — HOSPITAL ENCOUNTER (EMERGENCY)
Facility: HOSPITAL | Age: 49
Discharge: HOME | End: 2019-07-10
Attending: EMERGENCY MEDICINE | Admitting: EMERGENCY MEDICINE

## 2019-07-10 VITALS
WEIGHT: 192 LBS | SYSTOLIC BLOOD PRESSURE: 136 MMHG | DIASTOLIC BLOOD PRESSURE: 94 MMHG | BODY MASS INDEX: 27.49 KG/M2 | HEART RATE: 88 BPM | OXYGEN SATURATION: 99 % | TEMPERATURE: 97.7 F | HEIGHT: 70 IN | RESPIRATION RATE: 20 BRPM

## 2019-07-10 DIAGNOSIS — S81.831A GUNSHOT WOUND OF RIGHT LOWER LEG, INITIAL ENCOUNTER: Primary | ICD-10-CM

## 2019-07-10 PROCEDURE — 96374 THER/PROPH/DIAG INJ IV PUSH: CPT

## 2019-07-10 PROCEDURE — 73590 X-RAY EXAM OF LOWER LEG: CPT

## 2019-07-10 PROCEDURE — 25010000002 FENTANYL CITRATE (PF) 100 MCG/2ML SOLUTION: Performed by: EMERGENCY MEDICINE

## 2019-07-10 PROCEDURE — 99283 EMERGENCY DEPT VISIT LOW MDM: CPT

## 2019-07-10 PROCEDURE — 73630 X-RAY EXAM OF FOOT: CPT

## 2019-07-10 RX ORDER — OXYCODONE HYDROCHLORIDE AND ACETAMINOPHEN 5; 325 MG/1; MG/1
1 TABLET ORAL EVERY 4 HOURS PRN
Qty: 20 TABLET | Refills: 0 | OUTPATIENT
Start: 2019-07-10 | End: 2020-01-27

## 2019-07-10 RX ORDER — LIDOCAINE HYDROCHLORIDE AND EPINEPHRINE 10; 10 MG/ML; UG/ML
INJECTION, SOLUTION INFILTRATION; PERINEURAL
Status: COMPLETED
Start: 2019-07-10 | End: 2019-07-10

## 2019-07-10 RX ORDER — BACITRACIN ZINC 500 [USP'U]/G
OINTMENT TOPICAL ONCE
Status: DISCONTINUED | OUTPATIENT
Start: 2019-07-10 | End: 2019-07-10 | Stop reason: HOSPADM

## 2019-07-10 RX ORDER — LIDOCAINE HYDROCHLORIDE AND EPINEPHRINE 10; 10 MG/ML; UG/ML
10 INJECTION, SOLUTION INFILTRATION; PERINEURAL ONCE
Status: COMPLETED | OUTPATIENT
Start: 2019-07-10 | End: 2019-07-10

## 2019-07-10 RX ORDER — OXYCODONE HYDROCHLORIDE AND ACETAMINOPHEN 5; 325 MG/1; MG/1
2 TABLET ORAL ONCE
Status: COMPLETED | OUTPATIENT
Start: 2019-07-10 | End: 2019-07-10

## 2019-07-10 RX ORDER — CEPHALEXIN 500 MG/1
500 CAPSULE ORAL 3 TIMES DAILY
Qty: 21 CAPSULE | Refills: 0 | Status: SHIPPED | OUTPATIENT
Start: 2019-07-10 | End: 2019-07-17

## 2019-07-10 RX ORDER — FENTANYL CITRATE 50 UG/ML
50 INJECTION, SOLUTION INTRAMUSCULAR; INTRAVENOUS ONCE
Status: COMPLETED | OUTPATIENT
Start: 2019-07-10 | End: 2019-07-10

## 2019-07-10 RX ADMIN — LIDOCAINE HYDROCHLORIDE AND EPINEPHRINE 10 ML: 10; 10 INJECTION, SOLUTION INFILTRATION; PERINEURAL at 13:55

## 2019-07-10 RX ADMIN — FENTANYL CITRATE 50 MCG: 50 INJECTION INTRAMUSCULAR; INTRAVENOUS at 14:39

## 2019-07-10 RX ADMIN — LIDOCAINE HYDROCHLORIDE,EPINEPHRINE BITARTRATE 10 ML: 10; .01 INJECTION, SOLUTION INFILTRATION; PERINEURAL at 13:55

## 2019-07-10 RX ADMIN — OXYCODONE HYDROCHLORIDE AND ACETAMINOPHEN 2 TABLET: 5; 325 TABLET ORAL at 15:24
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
## 2021-09-16 NOTE — TELEPHONE ENCOUNTER
Refill request from [facility]. Sent.   O-Z Plasty Text: The defect edges were debeveled with a #15 scalpel blade.  Given the location of the defect, shape of the defect and the proximity to free margins an O-Z plasty (double transposition flap) was deemed most appropriate.  Using a sterile surgical marker, the appropriate transposition flaps were drawn incorporating the defect and placing the expected incisions within the relaxed skin tension lines where possible.    The area thus outlined was incised deep to adipose tissue with a #15 scalpel blade.  The skin margins were undermined to an appropriate distance in all directions utilizing iris scissors.  Hemostasis was achieved with electrocautery.  The flaps were then transposed into place, one clockwise and the other counterclockwise, and anchored with interrupted buried subcutaneous sutures.

## 2021-11-15 PROCEDURE — U0004 COV-19 TEST NON-CDC HGH THRU: HCPCS | Performed by: EMERGENCY MEDICINE

## 2022-11-29 ENCOUNTER — HOSPITAL ENCOUNTER (EMERGENCY)
Facility: HOSPITAL | Age: 52
Discharge: HOME | End: 2022-11-29
Attending: HOSPITALIST
Payer: MEDICAID

## 2022-11-29 VITALS
DIASTOLIC BLOOD PRESSURE: 63 MMHG | RESPIRATION RATE: 16 BRPM | SYSTOLIC BLOOD PRESSURE: 114 MMHG | TEMPERATURE: 98.1 F | OXYGEN SATURATION: 98 % | HEART RATE: 72 BPM

## 2022-11-29 DIAGNOSIS — H65.01 RIGHT ACUTE SEROUS OTITIS MEDIA, RECURRENCE NOT SPECIFIED: Primary | ICD-10-CM

## 2022-11-29 PROCEDURE — 99283 EMERGENCY DEPT VISIT LOW MDM: CPT

## 2022-11-29 RX ORDER — METHADONE HYDROCHLORIDE 5 MG/5ML
SOLUTION ORAL
COMMUNITY

## 2022-11-29 RX ORDER — AMOXICILLIN AND CLAVULANATE POTASSIUM 875; 125 MG/1; MG/1
1 TABLET, FILM COATED ORAL 2 TIMES DAILY
Qty: 20 TABLET | Refills: 0 | Status: SHIPPED | OUTPATIENT
Start: 2022-11-29 | End: 2022-12-09

## 2022-11-29 ASSESSMENT — PAIN DESCRIPTION - ORIENTATION: ORIENTATION: RIGHT

## 2022-11-29 ASSESSMENT — PAIN - FUNCTIONAL ASSESSMENT: PAIN_FUNCTIONAL_ASSESSMENT: 0-10

## 2022-11-29 ASSESSMENT — PAIN DESCRIPTION - DESCRIPTORS: DESCRIPTORS: PRESSURE

## 2022-11-29 ASSESSMENT — PAIN SCALES - GENERAL: PAINLEVEL_OUTOF10: 10

## 2022-11-29 ASSESSMENT — PAIN DESCRIPTION - LOCATION: LOCATION: EAR

## 2022-11-29 NOTE — ED PROVIDER NOTES
[facility] ENCOUNTER      Pt Name: Raúl Puentes  MRN: [id number]  YOB: 1970  Date of evaluation: 11/29/2022  Provider: Kallie Morales, [address] [place] [address]       Chief Complaint   Patient presents with    Ear Fullness     right           HISTORY OF PRESENT ILLNESS  (Location/Symptom, Timing/Onset, Context/Setting, Quality, Duration, Modifying Factors, Severity.)   Raúl Puentes is a 47 y.o. male who presents to the emergency department for right ear fullness/pain. Patient states he has sensation like he is cannot hear anything out of his ear like he is back into a tunnel. He states has been ongoing for about a week to week and a half. He denies any trauma or injury to the ear. Denies any headaches at the ordinary. He states he sometimes just gets some chills but he has a PICC line is recently on some antibiotics because of a foot wound. He states he is no longer on these antibiotics. Denies any nasal congestion or discharge. Denies any headaches at the ordinary. Denies any loss of taste or smell. Denies any sore throat. His only chief complaint is that he has fullness and discomfort to his right ear. Nursing notes were reviewed. REVIEW OFSYSTEMS    (2-9 systems for level 4, 10 or more for level 5)   ROS:  General:  No fevers, no chills, no weakness  Cardiovascular:  No chest pain, no palpitations  Respiratory:  No shortness of breath, no cough, no wheezing  Gastrointestinal:  No pain, no nausea, no vomiting, no diarrhea  Musculoskeletal:  No muscle pain, no joint pain  Skin:  No rash, no easy bruising  Neurologic:  No speech problems, no headache, no extremity weakness  Psychiatric:  No anxiety  Genitourinary:  No dysuria, no hematuria  ENT: +right ear fullness    Except as noted above the remainder of the review of systems was reviewed and negative.        PAST MEDICAL HISTORY     Past Medical History:   Diagnosis Date Diabetes mellitus ([facility] [address]     Hypertension          SURGICAL HISTORY     History reviewed. No pertinent surgical history. CURRENT MEDICATIONS       Previous Medications    ATENOLOL PO    Take by mouth    METFORMIN HCL PO    Take by mouth    METHADONE 5 MG/5ML SOLUTION           ALLERGIES     Bactrim [sulfamethoxazole-trimethoprim] and Levaquin [levofloxacin]    FAMILY HISTORY     History reviewed. No pertinent family history. SOCIAL HISTORY       Social History     Socioeconomic History    Marital status:      Spouse name: None    Number of children: None    Years of education: None    Highest education level: None         PHYSICAL EXAM    (up to 7 for level 4, 8 or more for level 5)     ED Triage Vitals   BP Temp Temp src Pulse Resp SpO2 Height Weight   -- -- -- -- -- -- -- --       Physical Exam  General :Patient is awake, alert, oriented, in no acute distress, nontoxic appearing  HEENT: Pupils are equally round and reactive to light, EOMI, conjunctivae clear. Oral mucosa is moist, no exudate. Uvula is midline, the left tympanic membrane is a normal appearance but has some fluid behind it no erythema. The canal is normal on that side. The right canal is normal appearance. The right tympanic membrane also has some mild fluid behind it but is also retracted. There is no erythema or purulence noted. Cardiac: Heart regular rate, rhythm, no murmurs, rubs, or gallops  Lungs: Lungs are clear to auscultation, there is no wheezing, rhonchi, or rales. There is no use of accessory muscles. Abdomen: Abdomen is soft, nontender, nondistended. There is no firm or pulsatile masses, no rebound rigidity or guarding. Musculoskeletal: No focal muscle deficits are appreciated  Neuro: Motor intact, sensory intact, level of consciousness is normal  Dermatology: Skin is warm and dry  Psych: Mentation is grossly normal, cognition is grossly normal. Affect is appropriate.       DIAGNOSTIC RESULTS     EKG: All EKG's are interpreted by the Emergency Department Physician who either signs or Co-signs this chart in the [address] a cardiologist.        RADIOLOGY:   Non-plain film images such as CT, Ultrasound and MRI are read by the radiologist. Plain radiographic images are visualized and preliminarily interpreted by the emergency physician with the below findings:      [] Radiologist's Report Reviewed:  No orders to display         ED BEDSIDE ULTRASOUND:   Performed by ED Physician - none    LABS:    I have reviewed and interpreted all of the currently available lab results from this visit (ifapplicable):  No results found for this visit on 11/29/22. All other labs were within normal range or not returned as of this dictation. EMERGENCY DEPARTMENT COURSE and DIFFERENTIAL DIAGNOSIS/MDM:   Vitals:    Vitals:    11/29/22 1717   BP: 106/71   Pulse: 71   Resp: 16   Temp: 98.1 °F (36.7 °C)   TempSrc: Oral   SpO2: 98%       MEDICATIONS ADMINISTERED IN ED:  Medications - No data to display    After initial evaluation and examination I did have a conversation with the patient about upcoming plan, treatment and possible disposition which they are agreeable to the times dictation. Patient advised that he does have retracted eardrum with looks like a serous otitis media. Since he is having difficulty with his hearing has been ongoing for the past week and a half we will go him place him on antibiotics prophylactically. We will place him on Augmentin 1 tablet twice a day for 10 days. Otherwise patient advised use Tylenol Motrin for body aches fevers or chills. Patient be discharged home in stable condition. The patient advised that he does need to follow-up with his regular family physician within the next 1 to 2 days for evaluation. He was also given instructions of his symptoms worsens or new symptoms arise he should return back to emergency department for further evaluation work-up.     Is this patient to be included in the SEP-1 Core Measure due to severe sepsis or septic shock? No   Exclusion criteria - the patient is NOT to be included for SEP-1 Core Measure due to:  2+ SIRS criteria are not met          CONSULTS:  None    PROCEDURES:  Procedures    CRITICAL CARE TIME    Total Critical Care time was 0 minutes, excluding separately reportable procedures. There was a high probability of clinically significant/life threatening deterioration in the patient's condition which required my urgent intervention. FINAL IMPRESSION      1. Right acute serous otitis media, recurrence not specified          DISPOSITION/PLAN   DISPOSITION Decision To Discharge 11/29/2022 05:36:03 PM      PATIENT REFERRED TO:  Vaishali Angulo  [address]  [phone]    In 2 days      [facility]  [facility] [address] [facility]  [phone]    As needed, If symptoms worsen    DISCHARGE MEDICATIONS:  New Prescriptions    AMOXICILLIN-CLAVULANATE (AUGMENTIN) 875-125 MG PER TABLET    Take 1 tablet by mouth 2 times daily for 10 days       Comment: Please note this report has been produced using speech recognition software and may contain errorsrelated to that system including errors in grammar, punctuation, and spelling, as well as words and phrases that may be inappropriate. If there are any questions or concerns please feel free to contact the dictating providerfor clarification.     Tracy Dash DO  Attending Emergency Physician               Tracy Dash DO  11/29/22 0410

## 2022-11-29 NOTE — ED NOTES
Pt/family given d/c orders verbally and written. Pt/Family with no questions or concerns r/t d/c. Pt ambulatory to POV. No acute distress noted on d/c.      Keiko Paez RN  11/29/22 7871

## 2023-02-24 ENCOUNTER — HOSPITAL ENCOUNTER (EMERGENCY)
Facility: HOSPITAL | Age: 53
Discharge: HOME | End: 2023-02-24
Attending: EMERGENCY MEDICINE
Payer: MEDICAID

## 2023-02-24 VITALS
TEMPERATURE: 98.5 F | DIASTOLIC BLOOD PRESSURE: 72 MMHG | SYSTOLIC BLOOD PRESSURE: 111 MMHG | OXYGEN SATURATION: 96 % | RESPIRATION RATE: 18 BRPM | HEART RATE: 64 BPM

## 2023-02-24 DIAGNOSIS — S05.02XA ABRASION OF LEFT CONJUNCTIVA, INITIAL ENCOUNTER: Primary | ICD-10-CM

## 2023-02-24 PROCEDURE — 99283 EMERGENCY DEPT VISIT LOW MDM: CPT

## 2023-02-24 PROCEDURE — 6370000000 HC RX 637 (ALT 250 FOR IP): Performed by: EMERGENCY MEDICINE

## 2023-02-24 RX ORDER — TETRACAINE HYDROCHLORIDE 5 MG/ML
1 SOLUTION OPHTHALMIC ONCE
Status: COMPLETED | OUTPATIENT
Start: 2023-02-24 | End: 2023-02-24

## 2023-02-24 RX ORDER — ERYTHROMYCIN 5 MG/G
OINTMENT OPHTHALMIC EVERY 6 HOURS
Qty: 1 G | Refills: 0 | Status: SHIPPED | OUTPATIENT
Start: 2023-02-24 | End: 2023-03-01

## 2023-02-24 RX ADMIN — FLUORESCEIN SODIUM 2 MG: 1 STRIP OPHTHALMIC at 19:20

## 2023-02-24 RX ADMIN — TETRACAINE HYDROCHLORIDE 1 DROP: 5 SOLUTION OPHTHALMIC at 18:00

## 2023-02-24 NOTE — ED TRIAGE NOTES
Pt states his L eye is burning, draining, \"feels like something is in there\" x2 days. States his wife had pink eye recently.

## 2023-02-25 NOTE — ED PROVIDER NOTES
[address] ENCOUNTER        Pt Name: Lisandro Dasilva  MRN: [id number]  [place] 1970  Date of evaluation: 2/24/2023  Provider: Sunita Machado MD  PCP: Leobardo BARRETO  Note Started: 7:02 PM EST 2/24/23    CHIEF COMPLAINT       Chief Complaint   Patient presents with    Eye Pain     Lt eye pain       HISTORY OF PRESENT ILLNESS: 1 or more Elements     History from : Patient    Limitations to history : None    Lisandro Dasilva is a 47 y.o. male who presents to the emergency department with bilateral eye pain. This has been going on for almost a week. He states his eyes are itchy and watery at times. He believes that he got pinkeye from his girlfriend. He states his left eye is worse than his right eye. He denies using contacts or glasses. He has not tried anything over-the-counter. He denies blurry vision    Nursing Notes were all reviewed and agreed with or any disagreements were addressed in the HPI. REVIEW OF SYSTEMS :      Review of Systems    5 systems reviewed and negative except as in HPI/MDM    SURGICAL HISTORY   No past surgical history on file. CURRENTMEDICATIONS       Previous Medications    ATENOLOL PO    Take by mouth    METFORMIN HCL PO    Take by mouth    METHADONE 5 MG/5ML SOLUTION           ALLERGIES     Bactrim [sulfamethoxazole-trimethoprim] and Levaquin [levofloxacin]    FAMILYHISTORY     No family history on file. SOCIAL HISTORY          SCREENINGS                         CIWA Assessment  BP: 126/75  Heart Rate: 64           PHYSICAL EXAM  1 or more Elements     ED Triage Vitals [02/24/23 1730]   BP Temp Temp Source Heart Rate Resp SpO2 Height Weight   126/75 98.5 °F (36.9 °C) Oral 64 18 -- -- --       Physical Exam    My pulse oximetry interpretation is which is within the normal range    GENERAL APPEARANCE: Awake and alert. Cooperative. No acute distress. HEAD:  Atraumatic. EYES: EOM's grossly intact.   Pupils equal round reactive to light. No obvious abnormalities under light exam.  Under fluorescein small conjunctival abrasion at the 5 o'clock position of the left eye. No corneal or conjunctival laceration, dendritic ulcer or hyphema noted. ENT: Mucous membranes are moist.  No trismus. NECK:  Trachea midline. EXTREMITIES: No acute deformities. SKIN: Warm and dry. NEUROLOGICAL: Moves all 4 extremities spontaneously. PSYCHIATRIC: Normal mood. DIAGNOSTIC RESULTS   LABS:    Labs Reviewed - No data to display    When ordered only abnormal lab results are displayed. All other labs were within normal range or not returned as of this dictation. EKG:     RADIOLOGY:   Non-plain film images such as CT, Ultrasound and MRI are read by the radiologist. Plain radiographic images are visualized and preliminarily interpreted by the ED Provider with the below findings:        Interpretation per the Radiologist below, if available at the time of this note:    No orders to display     No results found. No results found. PROCEDURES   Unless otherwise noted below, none     Procedures    CRITICAL CARE TIME       EMERGENCY DEPARTMENT COURSE and DIFFERENTIAL DIAGNOSIS/MDM:   Vitals:    Vitals:    02/24/23 1730   BP: 126/75   Pulse: 64   Resp: 18   Temp: 98.5 °F (36.9 °C)   TempSrc: Oral       Patient was given the following medications:  Medications   tetracaine (TETRAVISC) 0.5 % ophthalmic solution 1 drop (has no administration in time range)   fluorescein ophthalmic strip 2 mg (has no administration in time range)             Is this patient to be included in the SEP-1 Core Measure due to severe sepsis or septic shock? PAST MEDICAL HISTORY      has a past medical history of Diabetes mellitus ([address] and Hypertension. CC/HPI Summary, DDx, ED Course, and Reassessment: Tesfaye Lynne is a 47 y.o. male who presents to the emergency department with bilateral eye pain. This has been going on for almost a week. He states his eyes are itchy and watery at times. He believes that he got pinkeye from his girlfriend. He states his left eye is worse than his right eye. He denies using contacts or glasses. He has not tried anything over-the-counter. He denies blurry vision    Patient appears to have a conjunctival abrasion. This is in the left eye. I do not appreciate anything in his right eye. I do not see any edema or corneal laceration or ulcer. I will start him on erythromycin ointment. Patient encouraged to follow-up with PCP. Given strict return precautions. I am the Primary Clinician of Record. FINAL IMPRESSION    No diagnosis found. DISPOSITION/PLAN     DISPOSITION        PATIENT REFERRED TO:  No follow-up provider specified.     DISCHARGE MEDICATIONS:  New Prescriptions    No medications on file       DISCONTINUED MEDICATIONS:  Discontinued Medications    No medications on file              (Please note that portions of this note were completed with a voice recognition program.  Efforts were made to edit the dictations but occasionally words are mis-transcribed.)    Ron Cohen MD (electronically signed)           Oralia Olivo MD  02/25/23 0252

## 2023-02-25 NOTE — ED NOTES
Discharge instructions reviewed with pt and understanding verbalized, pt has no further needs or concerns at this time. Pt ambulated out of ED without difficulty.         Aniceto Beckford RN  02/24/23 6960 Discussed with the patient the importance of good control of their blood sugar, blood pressure, cholesterol, diet, exercise, weight, and medication usage under the guidance of their diabetic doctor to prevent/halt diabetic retinopathy.

## 2023-05-15 ENCOUNTER — TRANSCRIBE ORDERS (OUTPATIENT)
Dept: ULTRASOUND IMAGING | Facility: HOSPITAL | Age: 53
End: 2023-05-15
Payer: COMMERCIAL

## 2023-05-15 ENCOUNTER — HOSPITAL ENCOUNTER (OUTPATIENT)
Dept: ULTRASOUND IMAGING | Facility: HOSPITAL | Age: 53
Discharge: HOME | End: 2023-05-15
Admitting: ORTHOPAEDIC SURGERY
Payer: COMMERCIAL

## 2023-05-15 ENCOUNTER — TRANSCRIBE ORDERS (OUTPATIENT)
Dept: ADMINISTRATIVE | Facility: HOSPITAL | Age: 53
End: 2023-05-15
Payer: COMMERCIAL

## 2023-05-15 DIAGNOSIS — M79.89 CALF SWELLING: Primary | ICD-10-CM

## 2023-05-15 DIAGNOSIS — M79.89 CALF SWELLING: ICD-10-CM

## 2023-05-15 PROCEDURE — 93971 EXTREMITY STUDY: CPT

## 2023-06-01 ENCOUNTER — OFFICE VISIT (OUTPATIENT)
Dept: PRIMARY CARE CLINIC | Age: 53
End: 2023-06-01
Payer: MEDICAID

## 2023-06-01 ENCOUNTER — HOSPITAL ENCOUNTER (EMERGENCY)
Facility: HOSPITAL | Age: 53
Discharge: HOME | End: 2023-06-01
Attending: EMERGENCY MEDICINE
Payer: MEDICAID

## 2023-06-01 ENCOUNTER — APPOINTMENT (OUTPATIENT)
Dept: GENERAL RADIOLOGY | Facility: HOSPITAL | Age: 53
End: 2023-06-01
Payer: MEDICAID

## 2023-06-01 VITALS
OXYGEN SATURATION: 100 % | DIASTOLIC BLOOD PRESSURE: 71 MMHG | HEIGHT: 70 IN | HEART RATE: 92 BPM | TEMPERATURE: 98.4 F | WEIGHT: 182 LBS | BODY MASS INDEX: 26.05 KG/M2 | SYSTOLIC BLOOD PRESSURE: 108 MMHG

## 2023-06-01 VITALS
HEART RATE: 78 BPM | RESPIRATION RATE: 16 BRPM | OXYGEN SATURATION: 97 % | SYSTOLIC BLOOD PRESSURE: 114 MMHG | TEMPERATURE: 98.4 F | HEIGHT: 67 IN | WEIGHT: 182 LBS | DIASTOLIC BLOOD PRESSURE: 64 MMHG | BODY MASS INDEX: 28.56 KG/M2

## 2023-06-01 DIAGNOSIS — I95.9 HYPOTENSION, UNSPECIFIED HYPOTENSION TYPE: Primary | ICD-10-CM

## 2023-06-01 DIAGNOSIS — R42 DIZZINESS: ICD-10-CM

## 2023-06-01 DIAGNOSIS — R53.1 WEAKNESS GENERALIZED: ICD-10-CM

## 2023-06-01 DIAGNOSIS — R89.5 POSITIVE CULTURE FINDINGS IN WOUND: ICD-10-CM

## 2023-06-01 LAB
ALBUMIN SERPL-MCNC: 3.1 G/DL (ref 3.4–4.8)
ALBUMIN/GLOB SERPL: 1.1 {RATIO} (ref 0.8–2)
ALP SERPL-CCNC: 106 U/L (ref 25–100)
ALT SERPL-CCNC: 35 U/L (ref 4–36)
ANION GAP SERPL CALCULATED.3IONS-SCNC: 11 MMOL/L (ref 3–16)
AST SERPL-CCNC: 55 U/L (ref 8–33)
BASOPHILS # BLD: 0.1 K/UL (ref 0–0.1)
BASOPHILS NFR BLD: 0.7 %
BILIRUB SERPL-MCNC: <0.2 MG/DL (ref 0.3–1.2)
BUN SERPL-MCNC: 25 MG/DL (ref 6–20)
CALCIUM SERPL-MCNC: 8.8 MG/DL (ref 8.5–10.5)
CHLORIDE SERPL-SCNC: 101 MMOL/L (ref 98–107)
CO2 SERPL-SCNC: 22 MMOL/L (ref 20–30)
CREAT SERPL-MCNC: 1.5 MG/DL (ref 0.4–1.2)
EOSINOPHIL # BLD: 0.3 K/UL (ref 0–0.4)
EOSINOPHIL NFR BLD: 3.9 %
ERYTHROCYTE [DISTWIDTH] IN BLOOD BY AUTOMATED COUNT: 16.2 % (ref 11–16)
GFR SERPLBLD CREATININE-BSD FMLA CKD-EPI: 55 ML/MIN/{1.73_M2}
GLOBULIN SER CALC-MCNC: 2.9 G/DL
GLUCOSE SERPL-MCNC: 114 MG/DL (ref 74–106)
HCT VFR BLD AUTO: 30.6 % (ref 40–54)
HGB BLD-MCNC: 9.5 G/DL (ref 13–18)
IMM GRANULOCYTES # BLD: 0 K/UL
IMM GRANULOCYTES NFR BLD: 0.3 % (ref 0–5)
LACTATE BLDV-SCNC: 2 MMOL/L (ref 0.4–2)
LYMPHOCYTES # BLD: 3.7 K/UL (ref 1.5–4)
LYMPHOCYTES NFR BLD: 54 %
MCH RBC QN AUTO: 25.5 PG (ref 27–32)
MCHC RBC AUTO-ENTMCNC: 31 G/DL (ref 31–35)
MCV RBC AUTO: 82.3 FL (ref 80–100)
MONOCYTES # BLD: 0.8 K/UL (ref 0.2–0.8)
MONOCYTES NFR BLD: 11.6 %
NEUTROPHILS # BLD: 2 K/UL (ref 2–7.5)
NEUTS SEG NFR BLD: 29.5 %
PLATELET # BLD AUTO: 265 K/UL (ref 150–400)
PMV BLD AUTO: 9.6 FL (ref 6–10)
POTASSIUM SERPL-SCNC: 4.3 MMOL/L (ref 3.4–5.1)
PROT SERPL-MCNC: 6 G/DL (ref 6.4–8.3)
RBC # BLD AUTO: 3.72 M/UL (ref 4.5–6)
SARS-COV-2 RDRP RESP QL NAA+PROBE: NOT DETECTED
SODIUM SERPL-SCNC: 134 MMOL/L (ref 136–145)
WBC # BLD AUTO: 6.9 K/UL (ref 4–11)

## 2023-06-01 PROCEDURE — 83605 ASSAY OF LACTIC ACID: CPT

## 2023-06-01 PROCEDURE — 73630 X-RAY EXAM OF FOOT: CPT

## 2023-06-01 PROCEDURE — 87040 BLOOD CULTURE FOR BACTERIA: CPT

## 2023-06-01 PROCEDURE — 80053 COMPREHEN METABOLIC PANEL: CPT

## 2023-06-01 PROCEDURE — 93005 ELECTROCARDIOGRAM TRACING: CPT

## 2023-06-01 PROCEDURE — 36415 COLL VENOUS BLD VENIPUNCTURE: CPT

## 2023-06-01 PROCEDURE — 99202 OFFICE O/P NEW SF 15 MIN: CPT | Performed by: NURSE PRACTITIONER

## 2023-06-01 PROCEDURE — 87635 SARS-COV-2 COVID-19 AMP PRB: CPT

## 2023-06-01 PROCEDURE — 99285 EMERGENCY DEPT VISIT HI MDM: CPT

## 2023-06-01 PROCEDURE — 71045 X-RAY EXAM CHEST 1 VIEW: CPT

## 2023-06-01 PROCEDURE — 2580000003 HC RX 258: Performed by: EMERGENCY MEDICINE

## 2023-06-01 PROCEDURE — 85025 COMPLETE CBC W/AUTO DIFF WBC: CPT

## 2023-06-01 RX ORDER — MONTELUKAST SODIUM 10 MG/1
10 TABLET ORAL DAILY
COMMUNITY
Start: 2023-05-11

## 2023-06-01 RX ORDER — ALLOPURINOL 100 MG/1
100 TABLET ORAL DAILY
COMMUNITY
Start: 2023-05-11

## 2023-06-01 RX ORDER — SODIUM CHLORIDE 9 MG/ML
30 INJECTION, SOLUTION INTRAVENOUS ONCE
Status: COMPLETED | OUTPATIENT
Start: 2023-06-01 | End: 2023-06-01

## 2023-06-01 RX ORDER — BUDESONIDE AND FORMOTEROL FUMARATE DIHYDRATE 160; 4.5 UG/1; UG/1
2 AEROSOL RESPIRATORY (INHALATION) 2 TIMES DAILY
COMMUNITY
Start: 2021-11-12

## 2023-06-01 RX ORDER — PANTOPRAZOLE SODIUM 40 MG/1
40 TABLET, DELAYED RELEASE ORAL DAILY
COMMUNITY
Start: 2023-05-18

## 2023-06-01 RX ORDER — ASPIRIN 81 MG/1
TABLET, CHEWABLE ORAL
COMMUNITY
Start: 2023-04-29

## 2023-06-01 RX ORDER — OXYCODONE HYDROCHLORIDE 5 MG/1
TABLET ORAL
COMMUNITY
Start: 2023-05-26

## 2023-06-01 RX ORDER — ROSUVASTATIN CALCIUM 10 MG/1
10 TABLET, COATED ORAL DAILY
COMMUNITY
Start: 2023-05-11

## 2023-06-01 RX ORDER — METFORMIN HYDROCHLORIDE 500 MG/1
TABLET, EXTENDED RELEASE ORAL
COMMUNITY
Start: 2023-05-11

## 2023-06-01 RX ORDER — TIZANIDINE 4 MG/1
4 TABLET ORAL EVERY 6 HOURS PRN
COMMUNITY
Start: 2023-05-17

## 2023-06-01 RX ORDER — IPRATROPIUM BROMIDE AND ALBUTEROL SULFATE 2.5; .5 MG/3ML; MG/3ML
SOLUTION RESPIRATORY (INHALATION)
COMMUNITY
Start: 2021-10-18

## 2023-06-01 RX ORDER — HYDROXYZINE PAMOATE 25 MG/1
CAPSULE ORAL
COMMUNITY
Start: 2023-05-17

## 2023-06-01 RX ORDER — ALBUTEROL SULFATE 90 UG/1
AEROSOL, METERED RESPIRATORY (INHALATION)
COMMUNITY
Start: 2023-04-04

## 2023-06-01 RX ORDER — CETIRIZINE HYDROCHLORIDE 10 MG/1
10 TABLET ORAL DAILY
COMMUNITY
Start: 2023-05-18

## 2023-06-01 RX ADMIN — SODIUM CHLORIDE 30 ML/KG/HR: 9 INJECTION, SOLUTION INTRAVENOUS at 16:40

## 2023-06-01 SDOH — ECONOMIC STABILITY: FOOD INSECURITY: WITHIN THE PAST 12 MONTHS, THE FOOD YOU BOUGHT JUST DIDN'T LAST AND YOU DIDN'T HAVE MONEY TO GET MORE.: NEVER TRUE

## 2023-06-01 SDOH — ECONOMIC STABILITY: FOOD INSECURITY: WITHIN THE PAST 12 MONTHS, YOU WORRIED THAT YOUR FOOD WOULD RUN OUT BEFORE YOU GOT MONEY TO BUY MORE.: NEVER TRUE

## 2023-06-01 SDOH — ECONOMIC STABILITY: HOUSING INSECURITY
IN THE LAST 12 MONTHS, WAS THERE A TIME WHEN YOU DID NOT HAVE A STEADY PLACE TO SLEEP OR SLEPT IN A SHELTER (INCLUDING NOW)?: NO

## 2023-06-01 SDOH — ECONOMIC STABILITY: INCOME INSECURITY: HOW HARD IS IT FOR YOU TO PAY FOR THE VERY BASICS LIKE FOOD, HOUSING, MEDICAL CARE, AND HEATING?: NOT HARD AT ALL

## 2023-06-01 ASSESSMENT — PATIENT HEALTH QUESTIONNAIRE - PHQ9
SUM OF ALL RESPONSES TO PHQ QUESTIONS 1-9: 0
SUM OF ALL RESPONSES TO PHQ QUESTIONS 1-9: 0
2. FEELING DOWN, DEPRESSED OR HOPELESS: 0
SUM OF ALL RESPONSES TO PHQ9 QUESTIONS 1 & 2: 0
SUM OF ALL RESPONSES TO PHQ QUESTIONS 1-9: 0
1. LITTLE INTEREST OR PLEASURE IN DOING THINGS: 0
SUM OF ALL RESPONSES TO PHQ QUESTIONS 1-9: 0

## 2023-06-01 ASSESSMENT — ENCOUNTER SYMPTOMS: RESPIRATORY NEGATIVE: 1

## 2023-06-01 NOTE — ED PROVIDER NOTES
patient will follow-up with their PCP in 1-2 days for reevaluation. If the patient or family members have anyfurther concerns or any worsening symptoms they will return to the ED for reevaluation. Is this patient to be included in the SEP-1 Core Measure due to severe sepsis or septic shock? No   Exclusion criteria - the patient is NOT to be included for SEP-1 Core Measure due to: Infection is not suspected          CONSULTS:  None    PROCEDURES:  Procedures    CRITICAL CARE TIME    Total Critical Care time was 0 minutes, excluding separately reportable procedures. There was a high probability of clinically significant/life threatening deterioration in the patient's condition which required my urgent intervention. FINAL IMPRESSION      1. Hypotension, unspecified hypotension type          DISPOSITION/PLAN   DISPOSITION Discharge - Pending Orders Complete 06/01/2023 04:47:18 PM  Stable discharge to home    PATIENT REFERRED TO:  Christiano Chacko  [address] [place] [phone]    Schedule an appointment as soon as possible for a visit in 3 days        DISCHARGE MEDICATIONS:  New Prescriptions    No medications on file       Comment: Please note this report has been produced using speech recognition software and may contain errorsrelated to that system including errors in grammar, punctuation, and spelling, as well as words and phrases that may be inappropriate. If there are any questions or concerns please feel free to contact the dictating providerfor clarification.     Zaynab Menezes MD  Attending Emergency Physician               Zaynab Menezes MD  06/01/23 5113

## 2023-06-01 NOTE — PROGRESS NOTES
GLUCOSE, BUN, CREATININE, CALCIUM, PROT, LABALBU, BILITOT, ALT, AST    No results found for: LABA1C, LABMICR, LDLCALC      No results found for: WBC, NEUTROABS, HGB, HCT, MCV, PLT    No results found for: TSH      ASSESSMENT/PLAN:     1. Hypotension, unspecified hypotension type  -New. Started 1 week ago. SBP  and having dizziness, weakness and fatigue. Orthostatics WNL. History of hypertension.   -Patient also with right foot positive wound culture Acetobacter receiving IV antibiotics via PICC line started 2 weeks ago. -Due to his new hypotension, concern for possible sepsis. Other differentials dehydration, low hemoglobin with his wound and recent surgeries, etc.   -Defer on to the ER for further work-up and medical management. Patient needs labs, possible IV fluids and further work-up. Patient agreeable and in route to hospital via private vehicle. Stable upon leaving. 2. Positive culture findings in wound  -New. Positive on 4/26/23 per chart review.   -Pt with LUE PICC line present for IV antibiotics for positive wound culture Acinetobacter. Started IV antibiotics 2 weeks ago per patient. Followed by ortho. Continue all scheduled appts.   -See 1.    3. Dizziness  4. Weakness generalized  New. Suspect due to new hypotension. See above. Defer to ED for further work-up. Patient agreeable. Report called to Ifeanyi Sr RN at [facility] ED. No orders of the defined types were placed in this encounter.

## 2023-06-01 NOTE — ED NOTES
Pt arrives to ED POV after being referred by Melchor Sims NP from [facility] for hypotension and fatigue. Pt currently on PICC line antibiotics for acinetobacter from his R foot wound.       Onelia Miller RN  06/01/23 2446

## 2023-06-01 NOTE — ED NOTES
Problem: At Risk for Falls  Goal: # Patient does not fall  Outcome: Outcome Met, Continue evaluating goal progress toward completion     Problem: Pressure Injury, Risk for  Goal: # Skin remains intact  Outcome: Outcome Met, Continue evaluating goal progress toward completion     Problem: Non-Pressure Injury Wound  Goal: # No deterioration in wound  Outcome: Outcome Met, Continue evaluating goal progress toward completion     Problem: At Risk for Injury Due to Fall  Goal: # Patient does not fall  Outcome: Outcome Met, Continue evaluating goal progress toward completion     Problem: Potential for injury, Restraints  Goal: # Remains free from injury  Outcome: Outcome Met, Continue evaluating goal progress toward completion     Problem: Pain  Goal: Acceptable pain/comfort level is achieved/maintained at rest (based on Pain Behaviors Scale)  Description: This goal is used for patients who are not able to self-report pain and are assessed for pain using the Pain Behaviors Scale  Outcome: Outcome Met, Continue evaluating goal progress toward completion     Problem: Impaired Physical Mobility  Goal: # Bed mobility, ambulation, and ADLs are maintained or returned to baseline during hospitalization  Outcome: Outcome Not Met, Continue to Monitor      Pt left ED ambulatory at this time. Pt given dressing supplies for R foot. Pt and significant other verbalized understanding of discharge instructions.       Foreign Charles RN  06/01/23 0414

## 2023-06-03 LAB
BACTERIA BLD CULT ORG #2: NORMAL
BACTERIA BLD CULT: NORMAL

## 2023-06-06 LAB
BACTERIA BLD CULT ORG #2: NORMAL
BACTERIA BLD CULT: NORMAL

## 2023-06-16 PROBLEM — D64.9 ANEMIA: Status: ACTIVE | Noted: 2023-06-16

## 2023-06-16 PROBLEM — E11.9 TYPE 2 DIABETES MELLITUS WITHOUT COMPLICATION, WITHOUT LONG-TERM CURRENT USE OF INSULIN (HCC): Status: ACTIVE | Noted: 2023-06-16

## 2023-06-16 PROBLEM — J44.9 CHRONIC OBSTRUCTIVE PULMONARY DISEASE (HCC): Status: ACTIVE | Noted: 2023-06-16

## 2023-06-16 PROBLEM — Z87.39 HISTORY OF GOUT: Status: ACTIVE | Noted: 2023-06-16

## 2023-06-19 DIAGNOSIS — D50.9 IRON DEFICIENCY ANEMIA, UNSPECIFIED IRON DEFICIENCY ANEMIA TYPE: Primary | ICD-10-CM

## 2023-06-19 RX ORDER — ASCORBIC ACID 500 MG
TABLET ORAL
Qty: 60 TABLET | Refills: 3 | Status: SHIPPED | OUTPATIENT
Start: 2023-06-19

## 2023-06-19 RX ORDER — FERROUS SULFATE 325(65) MG
325 TABLET ORAL 2 TIMES DAILY
Qty: 60 TABLET | Refills: 2 | Status: SHIPPED | OUTPATIENT
Start: 2023-06-19

## 2023-06-30 ENCOUNTER — OFFICE VISIT (OUTPATIENT)
Dept: PRIMARY CARE CLINIC | Age: 53
End: 2023-06-30
Payer: MEDICAID

## 2023-06-30 VITALS
HEART RATE: 83 BPM | DIASTOLIC BLOOD PRESSURE: 80 MMHG | OXYGEN SATURATION: 99 % | RESPIRATION RATE: 18 BRPM | TEMPERATURE: 98 F | SYSTOLIC BLOOD PRESSURE: 138 MMHG

## 2023-06-30 DIAGNOSIS — Z89.431 PARTIAL NONTRAUMATIC AMPUTATION OF FOOT, RIGHT (HCC): ICD-10-CM

## 2023-06-30 DIAGNOSIS — E78.2 MIXED HYPERLIPIDEMIA: ICD-10-CM

## 2023-06-30 DIAGNOSIS — J44.9 CHRONIC OBSTRUCTIVE PULMONARY DISEASE, UNSPECIFIED COPD TYPE (HCC): ICD-10-CM

## 2023-06-30 DIAGNOSIS — G54.6 PHANTOM PAIN AFTER AMPUTATION OF LOWER EXTREMITY (HCC): ICD-10-CM

## 2023-06-30 DIAGNOSIS — D50.9 IRON DEFICIENCY ANEMIA, UNSPECIFIED IRON DEFICIENCY ANEMIA TYPE: Primary | ICD-10-CM

## 2023-06-30 DIAGNOSIS — E11.9 TYPE 2 DIABETES MELLITUS WITHOUT COMPLICATION, WITHOUT LONG-TERM CURRENT USE OF INSULIN (HCC): ICD-10-CM

## 2023-06-30 LAB — HBA1C MFR BLD: 6.1 %

## 2023-06-30 PROCEDURE — 3044F HG A1C LEVEL LT 7.0%: CPT | Performed by: PHYSICIAN ASSISTANT

## 2023-06-30 PROCEDURE — 99214 OFFICE O/P EST MOD 30 MIN: CPT | Performed by: PHYSICIAN ASSISTANT

## 2023-06-30 ASSESSMENT — ENCOUNTER SYMPTOMS
SHORTNESS OF BREATH: 0
CONSTIPATION: 0
SORE THROAT: 0
DIARRHEA: 0
EYE PAIN: 0
ABDOMINAL PAIN: 0
COUGH: 0

## 2023-07-21 NOTE — PROGRESS NOTES
Patient: Matthew Santana    YOB: 1970    Date: 07/24/2023    Primary Care Provider: Maxine Roy PA    Chief Complaint   Patient presents with    Anemia       SUBJECTIVE:    History of present illness: Patient referred for endoscopic work-up for iron deficiency anemia.  He complains of no abdominal pain.  No black or bloody bowel movements.    The following portions of the patient's history were reviewed and updated as appropriate: allergies, current medications, past family history, past medical history, past social history, past surgical history and problem list.      Review of Systems   Constitutional:  Negative for activity change, chills, fever and unexpected weight change.   HENT:  Negative for hearing loss, trouble swallowing and voice change.    Eyes:  Negative for visual disturbance.   Respiratory:  Negative for apnea, cough, chest tightness, shortness of breath and wheezing.    Cardiovascular:  Negative for chest pain, palpitations and leg swelling.   Gastrointestinal:  Negative for abdominal distention, abdominal pain, anal bleeding, blood in stool, constipation, diarrhea, nausea, rectal pain and vomiting.   Endocrine: Negative for cold intolerance and heat intolerance.   Genitourinary:  Negative for difficulty urinating, dysuria and flank pain.   Musculoskeletal:  Negative for back pain.   Skin:  Negative for color change, rash and wound.   Neurological:  Negative for dizziness, syncope, speech difficulty, weakness, light-headedness, numbness and headaches.   Hematological:  Negative for adenopathy. Does not bruise/bleed easily.   Psychiatric/Behavioral:  Negative for confusion. The patient is not nervous/anxious.      Allergies:  Allergies   Allergen Reactions    Levofloxacin Anaphylaxis and Unknown (See Comments)    Sulfamethoxazole-Trimethoprim Other (See Comments) and Unknown (See Comments)     Renal failure    Renal failure  Renal failure  "      Medications:    Current Outpatient Medications:     Advair Diskus 500-50 MCG/DOSE DISKUS, Inhale 1 puff 2 (Two) Times a Day., Disp: , Rfl:     alfuzosin (UROXATRAL) 10 MG 24 hr tablet, Take 1 tablet by mouth Daily., Disp: , Rfl:     allopurinol (ZYLOPRIM) 100 MG tablet, take 1 tablet by mouth once daily, Disp: 30 tablet, Rfl: 5    aspirin 81 MG tablet, Take 1 tablet by mouth Daily., Disp: , Rfl:     B-D INTEGRA SYRINGE 23G X 1\" 3 ML misc, See Admin Instructions., Disp: , Rfl:     BD Hypodermic Needle 18G X 1\" misc, USE TO DRAW UP TESTOSTERONE, Disp: , Rfl:     cetirizine (zyrTEC) 10 MG tablet, Take 1 tablet by mouth Daily., Disp: , Rfl:     Dapagliflozin Propanediol (Farxiga) 10 MG tablet, Take 10 mg by mouth Daily., Disp: , Rfl:     Diclofenac Sodium (VOLTAREN) 1 % gel gel, APPLY 2 TO 4 GRAMS TO JOINT FOUR TIMES A DAY, Disp: , Rfl:     dutasteride (AVODART) 0.5 MG capsule, Take 1 capsule by mouth Daily., Disp: , Rfl:     Farxiga 10 MG tablet, Take 10 mg by mouth Daily., Disp: , Rfl:     fluticasone (Flonase) 50 MCG/ACT nasal spray, 2 sprays into the nostril(s) as directed by provider Daily. 2 puffs each nostril, Disp: 16 g, Rfl: 0    ipratropium-albuterol (DUO-NEB) 0.5-2.5 mg/3 ml nebulizer, , Disp: , Rfl:     Januvia 100 MG tablet, Take 1 tablet by mouth Daily., Disp: , Rfl:     LYRICA 150 MG capsule, Take 1 capsule by mouth 3 (Three) Times a Day., Disp: , Rfl: 0    metFORMIN ER (GLUCOPHAGE-XR) 500 MG 24 hr tablet, Take 2 tablets by mouth Every 12 (Twelve) Hours., Disp: , Rfl:     METFORMIN HCL ER PO, Take 1,000 mg by mouth 2 (Two) Times a Day., Disp: , Rfl: 0    montelukast (SINGULAIR) 10 MG tablet, Take 1 tablet by mouth Daily., Disp: , Rfl:     ONE TOUCH ULTRA TEST test strip, , Disp: , Rfl: 0    ONETOUCH DELICA LANCETS FINE misc, , Disp: , Rfl: 0    pantoprazole (PROTONIX) 40 MG EC tablet, Take 1 tablet by mouth Daily., Disp: , Rfl:     ProAir  (90 Base) MCG/ACT inhaler, Inhale 2 puffs Every 6 " (Six) Hours As Needed., Disp: , Rfl:     rosuvastatin (CRESTOR) 10 MG tablet, Take 1 tablet by mouth Daily., Disp: , Rfl: 0    Symbicort 160-4.5 MCG/ACT inhaler, Inhale 2 puffs 2 (Two) Times a Day., Disp: , Rfl:     Testosterone Cypionate (DEPOTESTOTERONE CYPIONATE) 200 MG/ML injection, INJECT 1/2 OF A MILLILITER IN THE MUSCLE EVERY WEEK, Disp: , Rfl:     tiZANidine (ZANAFLEX) 4 MG tablet, Take 1 tablet by mouth 3 (Three) Times a Day., Disp: , Rfl:     atenolol (TENORMIN) 50 MG tablet, Take 1 tablet by mouth Daily., Disp: , Rfl: 0    methadone (DOLOPHINE) 10 MG tablet, Take 1 tablet by mouth 3 (Three) Times a Day As Needed,, Disp: , Rfl: 0    ramipril (ALTACE) 10 MG capsule, Take 1 capsule by mouth Daily., Disp: , Rfl: 0    History:  Past Medical History:   Diagnosis Date    Acute renal failure     Allergic     Anxiety     Arthritis     Axillary pain     COPD (chronic obstructive pulmonary disease)     Coronary artery disease     Depression     Diabetes mellitus     Fracture of ankle     GERD (gastroesophageal reflux disease)     Gout     H/O exercise stress test     Hepatitis C     History of being tatooed     Hypertension     Injury of back     Injury of neck     Low back pain radiating to both legs     Peripheral neuropathy     Pre-diabetes     Seizure     STATES WAS ONE TIME AROUND 2013.      Shoulder pain     Sleep apnea     Vitamin B6 deficiency        Past Surgical History:   Procedure Laterality Date    ANKLE OPEN REDUCTION INTERNAL FIXATION Left 08/02/2016    ORIF    BELOW KNEE AMPUTATION      Left       Family History   Problem Relation Age of Onset    Other Other         CARDIAC DISORDER,pulmonary disease    Lung cancer Other     Arthritis Other     Gout Other     Colon cancer Neg Hx     Cirrhosis Neg Hx     Liver cancer Neg Hx     Liver disease Neg Hx     Esophageal cancer Neg Hx     Stomach cancer Neg Hx     Rectal cancer Neg Hx        Social History     Tobacco Use    Smoking status: Former     Types:  "Cigarettes     Start date:      Quit date: 2016     Years since quittin.4    Smokeless tobacco: Never   Vaping Use    Vaping Use: Never used   Substance Use Topics    Alcohol use: Yes     Alcohol/week: 5.0 standard drinks     Types: 5 Cans of beer per week     Comment: very rare    Drug use: No     Types: IV     Comment: heroin         OBJECTIVE:    Vital Signs:   Vitals:    23 1319   BP: 104/60   Pulse: 86   SpO2: 98%   Weight: 84 kg (185 lb 3.2 oz)   Height: 177.8 cm (70\")       Physical Exam:   General Appearance:    Alert, cooperative, in no acute distress   Head:    Normocephalic, without obvious abnormality, atraumatic   Eyes:            Normal.  No scleral icterus.  PERRLA    Lungs:     Clear to auscultation,respirations regular, even and                  unlabored    Heart:    Regular rhythm and normal rate, normal S1 and S2, no            murmur   Abdomen:     Normal bowel sounds, no masses, no organomegaly, soft        non-tender, non-distended, no guarding,    Extremities: Left lower extremity BKA   Skin:   No bleeding, bruising or rash   Neurologic:   Normal without gross deficits.   Psychiatric: No evidence of depression or anxiety        Results Review:   I reviewed the patient's new clinical results. labswere reviewed    Review of Systems was reviewed and confirmed as accurate as documented by the MA.    ASSESSMENT/PLAN:    1. Iron deficiency anemia, unspecified iron deficiency anemia type        I recommend endoscopic work-up for his iron deficiency anemia.  He understands what a colonoscopy and EGD are.  I explained the procedures to the patient as well as the risks of bleeding and perforation and they understand the ramifications of these potential complications and they wish to proceed.  I will also Hemoccult his stool.    Electronically signed by Eulogio Parekh MD  23          "

## 2023-07-24 ENCOUNTER — OFFICE VISIT (OUTPATIENT)
Dept: SURGERY | Facility: CLINIC | Age: 53
End: 2023-07-24
Payer: COMMERCIAL

## 2023-07-24 VITALS
HEIGHT: 70 IN | OXYGEN SATURATION: 98 % | DIASTOLIC BLOOD PRESSURE: 60 MMHG | BODY MASS INDEX: 26.51 KG/M2 | WEIGHT: 185.2 LBS | SYSTOLIC BLOOD PRESSURE: 104 MMHG | HEART RATE: 86 BPM

## 2023-07-24 DIAGNOSIS — D50.9 IRON DEFICIENCY ANEMIA, UNSPECIFIED IRON DEFICIENCY ANEMIA TYPE: Primary | ICD-10-CM

## 2023-07-24 PROCEDURE — 3078F DIAST BP <80 MM HG: CPT | Performed by: SURGERY

## 2023-07-24 PROCEDURE — 1160F RVW MEDS BY RX/DR IN RCRD: CPT | Performed by: SURGERY

## 2023-07-24 PROCEDURE — 1159F MED LIST DOCD IN RCRD: CPT | Performed by: SURGERY

## 2023-07-24 PROCEDURE — 3074F SYST BP LT 130 MM HG: CPT | Performed by: SURGERY

## 2023-07-24 PROCEDURE — 99204 OFFICE O/P NEW MOD 45 MIN: CPT | Performed by: SURGERY

## 2023-07-25 RX ORDER — BISACODYL 5 MG/1
5 TABLET, DELAYED RELEASE ORAL TAKE AS DIRECTED
Qty: 4 TABLET | Refills: 0 | Status: SHIPPED | OUTPATIENT
Start: 2023-07-25 | End: 2024-07-24

## 2023-07-25 RX ORDER — POLYETHYLENE GLYCOL 3350 17 G/17G
238 POWDER, FOR SOLUTION ORAL ONCE
Qty: 17 PACKET | Refills: 0 | Status: SHIPPED | OUTPATIENT
Start: 2023-07-25 | End: 2023-07-25

## 2023-08-02 ENCOUNTER — TELEPHONE (OUTPATIENT)
Dept: SURGERY | Facility: CLINIC | Age: 53
End: 2023-08-02

## 2023-08-16 ENCOUNTER — TELEPHONE (OUTPATIENT)
Dept: SURGERY | Facility: CLINIC | Age: 53
End: 2023-08-16

## 2023-08-16 NOTE — TELEPHONE ENCOUNTER
Called Matthew he stated that we are having problems with the insurance. Pt may need to be reschedule because of insurance.  
I attempted to contact the pt.  
[phone]

## 2023-08-17 ENCOUNTER — TELEPHONE (OUTPATIENT)
Dept: SURGERY | Facility: CLINIC | Age: 53
End: 2023-08-17

## 2023-08-17 NOTE — TELEPHONE ENCOUNTER
I tried to reach Mr. Santana again today regarding his insurance. We do not have active insurance for him-he is aware of this from my previous conversations with him. He stated in previous conversations that his medicaid is active but I have checked [place] Medicaid website multiple times and can not find active coverage. I left him a VM this morning and will try again to reach him this afternoon.    No

## 2023-08-18 ENCOUNTER — TELEPHONE (OUTPATIENT)
Dept: SURGERY | Facility: CLINIC | Age: 53
End: 2023-08-18

## 2023-08-18 NOTE — TELEPHONE ENCOUNTER
I s/w Mr. Santana again this morning regarding his insurance. His [employer] nor [place] medicaid are active, I explained that I have called and check online multiple times and still get the sandra response. He became upset and stated that the Social security office told him his insurance is active, I tried to explain that the Social security office does not handle medicaid plans only Medicare. I asked if he could please bring in proof of active insurance, he told me that was my job and he was not driving back to [place] for this. I went to tell him we would have to r/s his colonoscopy for 08/21, he stated to re schedule him then and hung up the phone.

## 2023-09-25 ENCOUNTER — OFFICE VISIT (OUTPATIENT)
Dept: PRIMARY CARE CLINIC | Age: 53
End: 2023-09-25
Payer: MEDICAID

## 2023-09-25 VITALS
DIASTOLIC BLOOD PRESSURE: 74 MMHG | OXYGEN SATURATION: 100 % | SYSTOLIC BLOOD PRESSURE: 116 MMHG | HEART RATE: 98 BPM | TEMPERATURE: 98.6 F

## 2023-09-25 DIAGNOSIS — L03.115 CELLULITIS AND ABSCESS OF RIGHT LOWER EXTREMITY: ICD-10-CM

## 2023-09-25 DIAGNOSIS — S91.301A OPEN WOUND OF RIGHT FOOT, INITIAL ENCOUNTER: Primary | ICD-10-CM

## 2023-09-25 DIAGNOSIS — L02.415 CELLULITIS AND ABSCESS OF RIGHT LOWER EXTREMITY: ICD-10-CM

## 2023-09-25 DIAGNOSIS — Z89.431 PARTIAL NONTRAUMATIC AMPUTATION OF FOOT, RIGHT (HCC): ICD-10-CM

## 2023-09-25 PROCEDURE — 99215 OFFICE O/P EST HI 40 MIN: CPT | Performed by: PHYSICIAN ASSISTANT

## 2023-09-25 RX ORDER — PREGABALIN 150 MG/1
150 CAPSULE ORAL 2 TIMES DAILY
COMMUNITY
Start: 2023-09-14

## 2023-09-25 RX ORDER — METHADONE HYDROCHLORIDE 5 MG/1
5 TABLET ORAL EVERY 8 HOURS
COMMUNITY
Start: 2023-09-14

## 2023-09-25 ASSESSMENT — ENCOUNTER SYMPTOMS
DIARRHEA: 0
CONSTIPATION: 0
COUGH: 0
SHORTNESS OF BREATH: 0
EYE PAIN: 0
COLOR CHANGE: 1
ABDOMINAL PAIN: 0
SORE THROAT: 0

## 2023-09-25 NOTE — PROGRESS NOTES
penetration of wound q tip 1.5 inches with RLE erythema and warmth mid tibia and below  Skin:     General: Skin is warm. Findings: No rash. Neurological:      General: No focal deficit present. Mental Status: He is alert. Psychiatric:         Mood and Affect: Mood normal.         Thought Content: Thought content normal.                 ASSESSMENT/PLAN:  1. Open wound of right foot, initial encounter  Right Plantar Foot  I have recommended direct admission with ID and Ortho coverage to RO Osteomyelitis  He needs further workup and imaging and IV Antibiotics  I spoke with Dr. Ronnie THOMAS who recommended pt go to [address] [place] [address] spoke with hospitalist Dr. Ginette Dandy @ [facility]  who accepted pt as direct admit to med surg bed 112-report given to [address] [place] [address] and [facility] coordinator  Pt  advised to go via private vehicle directly to [facility] for direct admission; his girlfriend is going to take him; he declines the need for transport today    2. Cellulitis and abscess of right lower extremity  RLE  Advised hospital admission  See Treatment plan as outlined in #1    3. Partial nontraumatic amputation of foot, right (HCC)  Digits 1 through 5   Followed by Dr. Dagmar Rueda and Dr. Ana Mcnamara ID in the past      50 minutes were spent preparing, examining and educating patient and completing the visit today September 25, 2023. Return if symptoms worsen or fail to improve. An electronic signature was used to authenticate this note.     --Avni Ha PA-C

## 2023-10-31 DIAGNOSIS — D50.9 IRON DEFICIENCY ANEMIA, UNSPECIFIED IRON DEFICIENCY ANEMIA TYPE: ICD-10-CM

## 2023-10-31 RX ORDER — FERROUS SULFATE 325(65) MG
325 TABLET ORAL 2 TIMES DAILY
Qty: 60 TABLET | Refills: 2 | Status: SHIPPED | OUTPATIENT
Start: 2023-10-31

## 2023-10-31 RX ORDER — ROSUVASTATIN CALCIUM 10 MG/1
10 TABLET, COATED ORAL DAILY
Qty: 30 TABLET | Refills: 2 | Status: SHIPPED | OUTPATIENT
Start: 2023-10-31

## 2023-10-31 RX ORDER — PREGABALIN 150 MG/1
150 CAPSULE ORAL 2 TIMES DAILY
Qty: 60 CAPSULE | Status: CANCELLED | OUTPATIENT
Start: 2023-10-31

## 2023-10-31 RX ORDER — ASPIRIN 81 MG/1
TABLET, CHEWABLE ORAL
Qty: 30 TABLET | Refills: 5 | Status: SHIPPED | OUTPATIENT
Start: 2023-10-31

## 2023-10-31 RX ORDER — METFORMIN HYDROCHLORIDE 500 MG/1
TABLET, EXTENDED RELEASE ORAL
Qty: 120 TABLET | Refills: 0 | Status: SHIPPED | OUTPATIENT
Start: 2023-10-31

## 2023-10-31 RX ORDER — MONTELUKAST SODIUM 10 MG/1
10 TABLET ORAL DAILY
Qty: 30 TABLET | Refills: 2 | Status: SHIPPED | OUTPATIENT
Start: 2023-10-31

## 2023-10-31 RX ORDER — PANTOPRAZOLE SODIUM 40 MG/1
40 TABLET, DELAYED RELEASE ORAL DAILY
Qty: 30 TABLET | Refills: 2 | Status: SHIPPED | OUTPATIENT
Start: 2023-10-31

## 2023-10-31 RX ORDER — ALLOPURINOL 100 MG/1
100 TABLET ORAL DAILY
Qty: 30 TABLET | Refills: 2 | Status: SHIPPED | OUTPATIENT
Start: 2023-10-31

## 2023-10-31 RX ORDER — ASCORBIC ACID 500 MG
TABLET ORAL
Qty: 60 TABLET | Refills: 3 | Status: SHIPPED | OUTPATIENT
Start: 2023-10-31

## 2023-10-31 RX ORDER — DAPAGLIFLOZIN 10 MG/1
10 TABLET, FILM COATED ORAL DAILY
Qty: 30 TABLET | Refills: 2 | Status: SHIPPED | OUTPATIENT
Start: 2023-10-31

## 2023-10-31 NOTE — TELEPHONE ENCOUNTER
Pt called asking for refills on all of his meds (unable to give me list I confirmed with multiple pharmacies what pt has been filling and pended) Pt states he is not able to come in r/t having amputations in September.  [address] is preferred pharmacy

## 2023-11-01 RX ORDER — METFORMIN HYDROCHLORIDE 500 MG/1
TABLET, EXTENDED RELEASE ORAL
Qty: 360 TABLET | OUTPATIENT
Start: 2023-11-01

## 2023-11-28 ENCOUNTER — TELEPHONE (OUTPATIENT)
Dept: PRIMARY CARE CLINIC | Age: 53
End: 2023-11-28

## 2023-11-28 NOTE — TELEPHONE ENCOUNTER
Pt  from [facility] called to provide a list of in-network PT/OT office's.    [address] [facility] :[phone]    [facility]: [phone]    [facility]: [phone]    Pt's case workers contact number: [phone]

## 2024-02-12 ENCOUNTER — HOSPITAL ENCOUNTER (EMERGENCY)
Facility: HOSPITAL | Age: 54
Discharge: HOME | End: 2024-02-12
Attending: HOSPITALIST
Payer: MEDICAID

## 2024-02-12 VITALS
HEART RATE: 75 BPM | DIASTOLIC BLOOD PRESSURE: 67 MMHG | WEIGHT: 160 LBS | OXYGEN SATURATION: 97 % | TEMPERATURE: 98.1 F | BODY MASS INDEX: 25.11 KG/M2 | SYSTOLIC BLOOD PRESSURE: 101 MMHG | RESPIRATION RATE: 16 BRPM | HEIGHT: 67 IN

## 2024-02-12 DIAGNOSIS — L97.121: Primary | ICD-10-CM

## 2024-02-12 DIAGNOSIS — T87.89: Primary | ICD-10-CM

## 2024-02-12 PROCEDURE — 2500000003 HC RX 250 WO HCPCS: Performed by: HOSPITALIST

## 2024-02-12 PROCEDURE — 99283 EMERGENCY DEPT VISIT LOW MDM: CPT

## 2024-02-12 RX ORDER — CLINDAMYCIN HYDROCHLORIDE 300 MG/1
300 CAPSULE ORAL 3 TIMES DAILY
Qty: 30 CAPSULE | Refills: 0 | Status: SHIPPED | OUTPATIENT
Start: 2024-02-12 | End: 2024-02-22

## 2024-02-12 RX ADMIN — SILVER SULFADIAZINE: 10 CREAM TOPICAL at 19:17

## 2024-02-12 NOTE — ED NOTES
Patient's wife reported patient's blood sugar is low at 77 according to phone and requested \"something\" to make it go back up. Patient given orange juice and peanut butter crackers after reported to RN.

## 2024-02-12 NOTE — ED TRIAGE NOTES
Patient with complaint of blister that has formed at the end of his below knee amputation to the left. Patient states that it has been ongoing for 6 days and he has been trying to treat it at home with no luck.

## 2024-02-13 NOTE — ED NOTES
Discharge instructions reviewed and medications discussed with verbalized understanding from patient. Patient had no further questions or concerns.

## 2024-02-13 NOTE — ED PROVIDER NOTES
[facility]  EMERGENCY DEPARTMENT ENCOUNTER        Pt Name: Ronald Khan  MRN: [id number]  Birthdate 1970  Date of evaluation: 2/12/2024  Provider: Chuck Liang DO  PCP: Jose Ramon Lobo MD  Note Started: 7:06 PM EST 2/12/24    CHIEF COMPLAINT       Chief Complaint   Patient presents with    Blister       HISTORY OF PRESENT ILLNESS: 1 or more Elements     History from : Patient    Limitations to history : None    Ronald Khan is a 53 y.o. male who presents to the emergency department for blister open area to the left BKA stump.  Patient has bilateral BKA's wearing prosthesis on the right at this time but states that he has not been able to wear 1 on the left for the last 5 days because of a blister forming.  He states has been trying to leave it open to air at home but he has been placing Neosporin ointment over the area.  He states this just looks red and tender and has been draining after he places the Neosporin on it.  He is unaware what could have caused the blister itself is that is never done that before he does have almost like a polyurethane type liner to the inside of the prosthetic leg.  He denies any fevers or chills with the symptoms.  Denies any pain to the area.  Denies any other complaint at this time.  Past medical history significant for diabetes and hypertension    Nursing Notes were all reviewed and agreed with or any disagreements were addressed in the HPI.    REVIEW OF SYSTEMS :      Review of Systems    Review of systems reviewed and negative except as in HPI/MDM    PAST MEDICAL HISTORY     Past Medical History:   Diagnosis Date    Diabetes mellitus (HCC)     Hypertension        SURGICAL HISTORY     Past Surgical History:   Procedure Laterality Date    LEG AMPUTATION BELOW KNEE Left     LEG AMPUTATION BELOW KNEE Right     TOE AMPUTATION Right        CURRENTMEDICATIONS       Previous Medications    ALLOPURINOL (ZYLOPRIM) 100 MG TABLET    Take 1

## 2024-04-12 ENCOUNTER — APPOINTMENT (OUTPATIENT)
Dept: CT IMAGING | Facility: HOSPITAL | Age: 54
DRG: 683 | End: 2024-04-12
Attending: EMERGENCY MEDICINE
Payer: MEDICAID

## 2024-04-12 ENCOUNTER — HOSPITAL ENCOUNTER (INPATIENT)
Facility: HOSPITAL | Age: 54
LOS: 1 days | Discharge: HOME HEALTH | DRG: 683 | End: 2024-04-13
Attending: EMERGENCY MEDICINE | Admitting: INTERNAL MEDICINE
Payer: MEDICAID

## 2024-04-12 DIAGNOSIS — R79.89 ELEVATED LFTS: ICD-10-CM

## 2024-04-12 DIAGNOSIS — Z89.511 S/P BILATERAL BKA (BELOW KNEE AMPUTATION) (HCC): ICD-10-CM

## 2024-04-12 DIAGNOSIS — K59.00 CONSTIPATION, UNSPECIFIED CONSTIPATION TYPE: ICD-10-CM

## 2024-04-12 DIAGNOSIS — N17.9 AKI (ACUTE KIDNEY INJURY) (HCC): Primary | ICD-10-CM

## 2024-04-12 DIAGNOSIS — Z89.512 S/P BILATERAL BKA (BELOW KNEE AMPUTATION) (HCC): ICD-10-CM

## 2024-04-12 DIAGNOSIS — S22.41XA CLOSED FRACTURE OF MULTIPLE RIBS OF RIGHT SIDE, INITIAL ENCOUNTER: ICD-10-CM

## 2024-04-12 PROBLEM — Z87.828 HISTORY OF MOTOR VEHICLE ACCIDENT: Status: ACTIVE | Noted: 2024-04-12

## 2024-04-12 PROBLEM — M62.82 RHABDOMYOLYSIS: Status: ACTIVE | Noted: 2024-04-12

## 2024-04-12 LAB
ALBUMIN SERPL-MCNC: 3.3 G/DL (ref 3.4–4.8)
ALBUMIN/GLOB SERPL: 1.1 {RATIO} (ref 0.8–2)
ALP SERPL-CCNC: 139 U/L (ref 25–100)
ALT SERPL-CCNC: 165 U/L (ref 4–36)
AMPHET UR QL SCN: ABNORMAL
ANION GAP SERPL CALCULATED.3IONS-SCNC: 20 MMOL/L (ref 3–16)
AST SERPL-CCNC: 328 U/L (ref 8–33)
BACTERIA URNS QL MICRO: ABNORMAL /HPF
BARBITURATES UR QL SCN: POSITIVE
BASOPHILS # BLD: 0.1 K/UL (ref 0–0.1)
BASOPHILS NFR BLD: 0.4 %
BENZODIAZ UR QL SCN: ABNORMAL
BILIRUB SERPL-MCNC: 0.9 MG/DL (ref 0.3–1.2)
BILIRUB UR QL STRIP.AUTO: ABNORMAL
BUN SERPL-MCNC: 40 MG/DL (ref 6–20)
BUPRENORPHINE QUAL, URINE: ABNORMAL
CALCIUM SERPL-MCNC: 8.7 MG/DL (ref 8.5–10.5)
CANNABINOIDS UR QL SCN: ABNORMAL
CASTS #/AREA URNS LPF: ABNORMAL /LPF
CHLORIDE SERPL-SCNC: 99 MMOL/L (ref 98–107)
CK SERPL-CCNC: 3090 U/L (ref 26–174)
CLARITY UR: CLEAR
CO2 SERPL-SCNC: 17 MMOL/L (ref 20–30)
COCAINE UR QL SCN: ABNORMAL
COLOR UR: ABNORMAL
CREAT SERPL-MCNC: 2.3 MG/DL (ref 0.4–1.2)
DRUG SCREEN COMMENT UR-IMP: ABNORMAL
EOSINOPHIL # BLD: 0.3 K/UL (ref 0–0.4)
EOSINOPHIL NFR BLD: 2.2 %
EPI CELLS #/AREA URNS HPF: ABNORMAL /HPF (ref 0–5)
ERYTHROCYTE [DISTWIDTH] IN BLOOD BY AUTOMATED COUNT: 13 % (ref 11–16)
ETHANOLAMINE SERPL-MCNC: NORMAL MG/DL (ref 0–0.08)
FENTANYL SCREEN, URINE: NORMAL
GFR SERPLBLD CREATININE-BSD FMLA CKD-EPI: 33 ML/MIN/{1.73_M2}
GLOBULIN SER CALC-MCNC: 3 G/DL
GLUCOSE BLD-MCNC: 98 MG/DL (ref 74–106)
GLUCOSE SERPL-MCNC: 141 MG/DL (ref 74–106)
GLUCOSE UR STRIP.AUTO-MCNC: 250 MG/DL
HBA1C MFR BLD: 7 %
HCT VFR BLD AUTO: 47.3 % (ref 40–54)
HGB BLD-MCNC: 15.8 G/DL (ref 13–18)
HGB UR QL STRIP.AUTO: ABNORMAL
IMM GRANULOCYTES # BLD: 0.1 K/UL
IMM GRANULOCYTES NFR BLD: 0.8 % (ref 0–5)
KETONES UR STRIP.AUTO-MCNC: 15 MG/DL
LEUKOCYTE ESTERASE UR QL STRIP.AUTO: NEGATIVE
LIPASE SERPL-CCNC: 17 U/L (ref 5.6–51.3)
LYMPHOCYTES # BLD: 1.7 K/UL (ref 1.5–4)
LYMPHOCYTES NFR BLD: 12.8 %
MCH RBC QN AUTO: 31.2 PG (ref 27–32)
MCHC RBC AUTO-ENTMCNC: 33.4 G/DL (ref 31–35)
MCV RBC AUTO: 93.5 FL (ref 80–100)
METHADONE UR QL SCN: ABNORMAL
METHAMPHET UR QL SCN: ABNORMAL
MONOCYTES # BLD: 1.3 K/UL (ref 0.2–0.8)
MONOCYTES NFR BLD: 9.4 %
NEUTROPHILS # BLD: 10.1 K/UL (ref 2–7.5)
NEUTS SEG NFR BLD: 74.4 %
NITRITE UR QL STRIP.AUTO: NEGATIVE
OPIATES UR QL SCN: POSITIVE
OXYCODONE UR QL SCN: ABNORMAL
PCP UR QL SCN: ABNORMAL
PERFORMED ON: NORMAL
PH UR STRIP.AUTO: 5.5 [PH] (ref 5–8)
PLATELET # BLD AUTO: 232 K/UL (ref 150–400)
PMV BLD AUTO: 9.5 FL (ref 6–10)
POTASSIUM SERPL-SCNC: 5 MMOL/L (ref 3.4–5.1)
PROPOXYPH UR QL SCN: ABNORMAL
PROT SERPL-MCNC: 6.3 G/DL (ref 6.4–8.3)
PROT UR STRIP.AUTO-MCNC: 100 MG/DL
RBC # BLD AUTO: 5.06 M/UL (ref 4.5–6)
RBC #/AREA URNS HPF: ABNORMAL /HPF (ref 0–4)
SARS-COV-2 RDRP RESP QL NAA+PROBE: NOT DETECTED
SODIUM SERPL-SCNC: 136 MMOL/L (ref 136–145)
SP GR UR STRIP.AUTO: 1.02 (ref 1–1.03)
TRICYCLICS UR QL SCN: ABNORMAL
UA COMPLETE W REFLEX CULTURE PNL UR: YES
UA DIPSTICK W REFLEX MICRO PNL UR: YES
URN SPEC COLLECT METH UR: ABNORMAL
UROBILINOGEN UR STRIP-ACNC: 1 E.U./DL
WBC # BLD AUTO: 13.6 K/UL (ref 4–11)
WBC #/AREA URNS HPF: ABNORMAL /HPF (ref 0–5)

## 2024-04-12 PROCEDURE — 87086 URINE CULTURE/COLONY COUNT: CPT

## 2024-04-12 PROCEDURE — 72131 CT LUMBAR SPINE W/O DYE: CPT

## 2024-04-12 PROCEDURE — G0480 DRUG TEST DEF 1-7 CLASSES: HCPCS

## 2024-04-12 PROCEDURE — 36415 COLL VENOUS BLD VENIPUNCTURE: CPT

## 2024-04-12 PROCEDURE — 81001 URINALYSIS AUTO W/SCOPE: CPT

## 2024-04-12 PROCEDURE — 70450 CT HEAD/BRAIN W/O DYE: CPT

## 2024-04-12 PROCEDURE — 2580000003 HC RX 258: Performed by: EMERGENCY MEDICINE

## 2024-04-12 PROCEDURE — 85025 COMPLETE CBC W/AUTO DIFF WBC: CPT

## 2024-04-12 PROCEDURE — 87635 SARS-COV-2 COVID-19 AMP PRB: CPT

## 2024-04-12 PROCEDURE — 96374 THER/PROPH/DIAG INJ IV PUSH: CPT

## 2024-04-12 PROCEDURE — 72128 CT CHEST SPINE W/O DYE: CPT

## 2024-04-12 PROCEDURE — 74176 CT ABD & PELVIS W/O CONTRAST: CPT

## 2024-04-12 PROCEDURE — 6360000002 HC RX W HCPCS: Performed by: PHYSICIAN ASSISTANT

## 2024-04-12 PROCEDURE — 6360000002 HC RX W HCPCS: Performed by: EMERGENCY MEDICINE

## 2024-04-12 PROCEDURE — 82077 ASSAY SPEC XCP UR&BREATH IA: CPT

## 2024-04-12 PROCEDURE — 99285 EMERGENCY DEPT VISIT HI MDM: CPT

## 2024-04-12 PROCEDURE — 82550 ASSAY OF CK (CPK): CPT

## 2024-04-12 PROCEDURE — 51798 US URINE CAPACITY MEASURE: CPT

## 2024-04-12 PROCEDURE — 83036 HEMOGLOBIN GLYCOSYLATED A1C: CPT

## 2024-04-12 PROCEDURE — 6370000000 HC RX 637 (ALT 250 FOR IP): Performed by: PHYSICIAN ASSISTANT

## 2024-04-12 PROCEDURE — 2580000003 HC RX 258: Performed by: PHYSICIAN ASSISTANT

## 2024-04-12 PROCEDURE — 80053 COMPREHEN METABOLIC PANEL: CPT

## 2024-04-12 PROCEDURE — 1200000000 HC SEMI PRIVATE

## 2024-04-12 PROCEDURE — 83690 ASSAY OF LIPASE: CPT

## 2024-04-12 PROCEDURE — 80307 DRUG TEST PRSMV CHEM ANLYZR: CPT

## 2024-04-12 PROCEDURE — 71250 CT THORAX DX C-: CPT

## 2024-04-12 PROCEDURE — 72125 CT NECK SPINE W/O DYE: CPT

## 2024-04-12 RX ORDER — GABAPENTIN 100 MG/1
100 CAPSULE ORAL 3 TIMES DAILY
Status: DISCONTINUED | OUTPATIENT
Start: 2024-04-12 | End: 2024-04-13 | Stop reason: HOSPADM

## 2024-04-12 RX ORDER — PREGABALIN 75 MG/1
150 CAPSULE ORAL 2 TIMES DAILY
Status: DISCONTINUED | OUTPATIENT
Start: 2024-04-12 | End: 2024-04-12

## 2024-04-12 RX ORDER — DEXTROSE MONOHYDRATE 100 MG/ML
INJECTION, SOLUTION INTRAVENOUS CONTINUOUS PRN
Status: DISCONTINUED | OUTPATIENT
Start: 2024-04-12 | End: 2024-04-13 | Stop reason: HOSPADM

## 2024-04-12 RX ORDER — TIAGABINE 16 MG/1
16 TABLET ORAL 2 TIMES DAILY
COMMUNITY

## 2024-04-12 RX ORDER — INSULIN LISPRO 100 [IU]/ML
0-4 INJECTION, SOLUTION INTRAVENOUS; SUBCUTANEOUS
Status: DISCONTINUED | OUTPATIENT
Start: 2024-04-12 | End: 2024-04-13 | Stop reason: HOSPADM

## 2024-04-12 RX ORDER — FERROUS SULFATE 325(65) MG
324 TABLET ORAL 2 TIMES DAILY WITH MEALS
Status: DISCONTINUED | OUTPATIENT
Start: 2024-04-12 | End: 2024-04-13 | Stop reason: HOSPADM

## 2024-04-12 RX ORDER — ACETAMINOPHEN 650 MG/1
650 SUPPOSITORY RECTAL EVERY 6 HOURS PRN
Status: DISCONTINUED | OUTPATIENT
Start: 2024-04-12 | End: 2024-04-13 | Stop reason: HOSPADM

## 2024-04-12 RX ORDER — SILDENAFIL 25 MG/1
100 TABLET, FILM COATED ORAL PRN
COMMUNITY

## 2024-04-12 RX ORDER — METHADONE HYDROCHLORIDE 5 MG/1
5 TABLET ORAL EVERY 8 HOURS SCHEDULED
Status: DISCONTINUED | OUTPATIENT
Start: 2024-04-12 | End: 2024-04-12

## 2024-04-12 RX ORDER — ROSUVASTATIN CALCIUM 10 MG/1
10 TABLET, COATED ORAL DAILY
Status: DISCONTINUED | OUTPATIENT
Start: 2024-04-12 | End: 2024-04-13 | Stop reason: HOSPADM

## 2024-04-12 RX ORDER — MORPHINE SULFATE 4 MG/ML
4 INJECTION, SOLUTION INTRAMUSCULAR; INTRAVENOUS ONCE
Status: COMPLETED | OUTPATIENT
Start: 2024-04-12 | End: 2024-04-12

## 2024-04-12 RX ORDER — RAMIPRIL 5 MG/1
5 CAPSULE ORAL DAILY
COMMUNITY
Start: 2024-03-25

## 2024-04-12 RX ORDER — LACTULOSE 10 G/15ML
30 SOLUTION ORAL ONCE
Status: COMPLETED | OUTPATIENT
Start: 2024-04-12 | End: 2024-04-12

## 2024-04-12 RX ORDER — ONDANSETRON 2 MG/ML
4 INJECTION INTRAMUSCULAR; INTRAVENOUS EVERY 6 HOURS PRN
Status: DISCONTINUED | OUTPATIENT
Start: 2024-04-12 | End: 2024-04-13 | Stop reason: HOSPADM

## 2024-04-12 RX ORDER — IBUPROFEN 600 MG/1
1 TABLET ORAL PRN
Status: DISCONTINUED | OUTPATIENT
Start: 2024-04-12 | End: 2024-04-13 | Stop reason: HOSPADM

## 2024-04-12 RX ORDER — BISACODYL 10 MG
10 SUPPOSITORY, RECTAL RECTAL DAILY PRN
Status: DISCONTINUED | OUTPATIENT
Start: 2024-04-12 | End: 2024-04-13 | Stop reason: HOSPADM

## 2024-04-12 RX ORDER — ATENOLOL 50 MG/1
50 TABLET ORAL DAILY
COMMUNITY

## 2024-04-12 RX ORDER — OXYCODONE HYDROCHLORIDE AND ACETAMINOPHEN 5; 325 MG/1; MG/1
2 TABLET ORAL EVERY 6 HOURS PRN
Status: DISCONTINUED | OUTPATIENT
Start: 2024-04-12 | End: 2024-04-13 | Stop reason: HOSPADM

## 2024-04-12 RX ORDER — POLYETHYLENE GLYCOL 3350 17 G/17G
17 POWDER, FOR SOLUTION ORAL DAILY PRN
Status: DISCONTINUED | OUTPATIENT
Start: 2024-04-12 | End: 2024-04-13 | Stop reason: HOSPADM

## 2024-04-12 RX ORDER — HYDROCODONE BITARTRATE AND ACETAMINOPHEN 5; 325 MG/1; MG/1
1 TABLET ORAL EVERY 6 HOURS PRN
Status: DISCONTINUED | OUTPATIENT
Start: 2024-04-12 | End: 2024-04-12

## 2024-04-12 RX ORDER — LIDOCAINE 4 G/G
1 PATCH TOPICAL DAILY
Status: DISCONTINUED | OUTPATIENT
Start: 2024-04-12 | End: 2024-04-13 | Stop reason: HOSPADM

## 2024-04-12 RX ORDER — 0.9 % SODIUM CHLORIDE 0.9 %
1000 INTRAVENOUS SOLUTION INTRAVENOUS ONCE
Status: COMPLETED | OUTPATIENT
Start: 2024-04-12 | End: 2024-04-12

## 2024-04-12 RX ORDER — OXYCODONE HYDROCHLORIDE AND ACETAMINOPHEN 5; 325 MG/1; MG/1
2 TABLET ORAL EVERY 4 HOURS PRN
Status: DISCONTINUED | OUTPATIENT
Start: 2024-04-12 | End: 2024-04-12

## 2024-04-12 RX ORDER — INSULIN LISPRO 100 [IU]/ML
INJECTION, SOLUTION INTRAVENOUS; SUBCUTANEOUS SEE ADMIN INSTRUCTIONS
COMMUNITY

## 2024-04-12 RX ORDER — POLYETHYLENE GLYCOL 3350 17 G/17G
17 POWDER, FOR SOLUTION ORAL DAILY
Status: DISCONTINUED | OUTPATIENT
Start: 2024-04-12 | End: 2024-04-13 | Stop reason: HOSPADM

## 2024-04-12 RX ORDER — HYDROXYZINE PAMOATE 25 MG/1
25 CAPSULE ORAL 3 TIMES DAILY PRN
Status: DISCONTINUED | OUTPATIENT
Start: 2024-04-12 | End: 2024-04-13 | Stop reason: HOSPADM

## 2024-04-12 RX ORDER — INSULIN LISPRO 100 [IU]/ML
0-4 INJECTION, SOLUTION INTRAVENOUS; SUBCUTANEOUS NIGHTLY
Status: DISCONTINUED | OUTPATIENT
Start: 2024-04-12 | End: 2024-04-13 | Stop reason: HOSPADM

## 2024-04-12 RX ORDER — SENNA AND DOCUSATE SODIUM 50; 8.6 MG/1; MG/1
1 TABLET, FILM COATED ORAL 2 TIMES DAILY
Status: DISCONTINUED | OUTPATIENT
Start: 2024-04-12 | End: 2024-04-13 | Stop reason: HOSPADM

## 2024-04-12 RX ORDER — ACYCLOVIR 400 MG/1
TABLET ORAL
COMMUNITY
Start: 2024-04-11

## 2024-04-12 RX ORDER — ONDANSETRON 4 MG/1
4 TABLET, ORALLY DISINTEGRATING ORAL EVERY 8 HOURS PRN
Status: DISCONTINUED | OUTPATIENT
Start: 2024-04-12 | End: 2024-04-13 | Stop reason: HOSPADM

## 2024-04-12 RX ORDER — INSULIN GLARGINE 100 [IU]/ML
15 INJECTION, SOLUTION SUBCUTANEOUS NIGHTLY
COMMUNITY

## 2024-04-12 RX ORDER — ENOXAPARIN SODIUM 100 MG/ML
40 INJECTION SUBCUTANEOUS DAILY
Status: DISCONTINUED | OUTPATIENT
Start: 2024-04-12 | End: 2024-04-13 | Stop reason: HOSPADM

## 2024-04-12 RX ORDER — METFORMIN HYDROCHLORIDE 500 MG/1
1000 TABLET, EXTENDED RELEASE ORAL 2 TIMES DAILY WITH MEALS
Status: DISCONTINUED | OUTPATIENT
Start: 2024-04-12 | End: 2024-04-13 | Stop reason: HOSPADM

## 2024-04-12 RX ORDER — SODIUM CHLORIDE 9 MG/ML
INJECTION, SOLUTION INTRAVENOUS CONTINUOUS
Status: DISCONTINUED | OUTPATIENT
Start: 2024-04-12 | End: 2024-04-13 | Stop reason: HOSPADM

## 2024-04-12 RX ORDER — ASPIRIN 81 MG/1
81 TABLET, CHEWABLE ORAL DAILY
Status: DISCONTINUED | OUTPATIENT
Start: 2024-04-12 | End: 2024-04-13 | Stop reason: HOSPADM

## 2024-04-12 RX ORDER — CETIRIZINE HYDROCHLORIDE 10 MG/1
10 TABLET ORAL DAILY
Status: DISCONTINUED | OUTPATIENT
Start: 2024-04-12 | End: 2024-04-13 | Stop reason: HOSPADM

## 2024-04-12 RX ORDER — ACETAMINOPHEN 325 MG/1
650 TABLET ORAL EVERY 6 HOURS PRN
Status: DISCONTINUED | OUTPATIENT
Start: 2024-04-12 | End: 2024-04-13 | Stop reason: HOSPADM

## 2024-04-12 RX ORDER — MORPHINE SULFATE 4 MG/ML
4 INJECTION, SOLUTION INTRAMUSCULAR; INTRAVENOUS 3 TIMES DAILY PRN
Status: DISCONTINUED | OUTPATIENT
Start: 2024-04-12 | End: 2024-04-13 | Stop reason: HOSPADM

## 2024-04-12 RX ORDER — ASCORBIC ACID 500 MG
500 TABLET ORAL 2 TIMES DAILY
Status: DISCONTINUED | OUTPATIENT
Start: 2024-04-12 | End: 2024-04-13 | Stop reason: HOSPADM

## 2024-04-12 RX ORDER — TIZANIDINE 4 MG/1
4 TABLET ORAL EVERY 8 HOURS PRN
Status: DISCONTINUED | OUTPATIENT
Start: 2024-04-12 | End: 2024-04-13 | Stop reason: HOSPADM

## 2024-04-12 RX ORDER — MONTELUKAST SODIUM 10 MG/1
10 TABLET ORAL
Status: DISCONTINUED | OUTPATIENT
Start: 2024-04-12 | End: 2024-04-13 | Stop reason: HOSPADM

## 2024-04-12 RX ORDER — PANTOPRAZOLE SODIUM 40 MG/1
40 TABLET, DELAYED RELEASE ORAL DAILY
Status: DISCONTINUED | OUTPATIENT
Start: 2024-04-12 | End: 2024-04-13 | Stop reason: HOSPADM

## 2024-04-12 RX ADMIN — MONTELUKAST SODIUM 10 MG: 10 TABLET, COATED ORAL at 22:16

## 2024-04-12 RX ADMIN — HYDROXYZINE PAMOATE 25 MG: 25 CAPSULE ORAL at 17:25

## 2024-04-12 RX ADMIN — ASPIRIN 81 MG 81 MG: 81 TABLET ORAL at 12:55

## 2024-04-12 RX ADMIN — GABAPENTIN 100 MG: 100 CAPSULE ORAL at 22:16

## 2024-04-12 RX ADMIN — SENNOSIDES AND DOCUSATE SODIUM 1 TABLET: 50; 8.6 TABLET ORAL at 22:16

## 2024-04-12 RX ADMIN — GABAPENTIN 100 MG: 100 CAPSULE ORAL at 14:46

## 2024-04-12 RX ADMIN — MORPHINE SULFATE 4 MG: 4 INJECTION, SOLUTION INTRAMUSCULAR; INTRAVENOUS at 07:08

## 2024-04-12 RX ADMIN — PANTOPRAZOLE SODIUM 40 MG: 40 TABLET, DELAYED RELEASE ORAL at 12:55

## 2024-04-12 RX ADMIN — OXYCODONE HYDROCHLORIDE AND ACETAMINOPHEN 500 MG: 500 TABLET ORAL at 12:55

## 2024-04-12 RX ADMIN — TIZANIDINE 4 MG: 4 TABLET ORAL at 17:25

## 2024-04-12 RX ADMIN — OXYCODONE HYDROCHLORIDE AND ACETAMINOPHEN 500 MG: 500 TABLET ORAL at 22:16

## 2024-04-12 RX ADMIN — SODIUM CHLORIDE: 9 INJECTION, SOLUTION INTRAVENOUS at 14:49

## 2024-04-12 RX ADMIN — OXYCODONE AND ACETAMINOPHEN 2 TABLET: 5; 325 TABLET ORAL at 17:38

## 2024-04-12 RX ADMIN — OXYCODONE AND ACETAMINOPHEN 2 TABLET: 5; 325 TABLET ORAL at 23:54

## 2024-04-12 RX ADMIN — SODIUM CHLORIDE 1000 ML: 9 INJECTION, SOLUTION INTRAVENOUS at 11:45

## 2024-04-12 RX ADMIN — MORPHINE SULFATE 4 MG: 4 INJECTION, SOLUTION INTRAMUSCULAR; INTRAVENOUS at 10:42

## 2024-04-12 RX ADMIN — POLYETHYLENE GLYCOL 3350 17 G: 17 POWDER, FOR SOLUTION ORAL at 12:55

## 2024-04-12 RX ADMIN — SODIUM CHLORIDE 1000 ML: 9 INJECTION, SOLUTION INTRAVENOUS at 10:41

## 2024-04-12 RX ADMIN — LACTULOSE 30 G: 20 SOLUTION ORAL at 12:55

## 2024-04-12 RX ADMIN — SENNOSIDES AND DOCUSATE SODIUM 1 TABLET: 50; 8.6 TABLET ORAL at 12:55

## 2024-04-12 RX ADMIN — MORPHINE SULFATE 4 MG: 4 INJECTION, SOLUTION INTRAMUSCULAR; INTRAVENOUS at 17:00

## 2024-04-12 RX ADMIN — HYDROCODONE BITARTRATE AND ACETAMINOPHEN 1 TABLET: 5; 325 TABLET ORAL at 12:12

## 2024-04-12 RX ADMIN — CETIRIZINE HYDROCHLORIDE 10 MG: 10 TABLET, FILM COATED ORAL at 12:55

## 2024-04-12 SDOH — ECONOMIC STABILITY: FOOD INSECURITY: WITHIN THE PAST 12 MONTHS, YOU WORRIED THAT YOUR FOOD WOULD RUN OUT BEFORE YOU GOT MONEY TO BUY MORE.: PATIENT DECLINED

## 2024-04-12 SDOH — ECONOMIC STABILITY: INCOME INSECURITY: HOW HARD IS IT FOR YOU TO PAY FOR THE VERY BASICS LIKE FOOD, HOUSING, MEDICAL CARE, AND HEATING?: PATIENT DECLINED

## 2024-04-12 SDOH — ECONOMIC STABILITY: INCOME INSECURITY: IN THE PAST 12 MONTHS, HAS THE ELECTRIC, GAS, OIL, OR WATER COMPANY THREATENED TO SHUT OFF SERVICE IN YOUR HOME?: NO

## 2024-04-12 ASSESSMENT — PAIN SCALES - GENERAL
PAINLEVEL_OUTOF10: 8
PAINLEVEL_OUTOF10: 10

## 2024-04-12 ASSESSMENT — PAIN DESCRIPTION - LOCATION
LOCATION: BACK;RIB CAGE
LOCATION: BACK

## 2024-04-12 ASSESSMENT — PATIENT HEALTH QUESTIONNAIRE - PHQ9
SUM OF ALL RESPONSES TO PHQ QUESTIONS 1-9: 0
SUM OF ALL RESPONSES TO PHQ QUESTIONS 1-9: 0
2. FEELING DOWN, DEPRESSED OR HOPELESS: NOT AT ALL
1. LITTLE INTEREST OR PLEASURE IN DOING THINGS: NOT AT ALL
SUM OF ALL RESPONSES TO PHQ9 QUESTIONS 1 & 2: 0
SUM OF ALL RESPONSES TO PHQ QUESTIONS 1-9: 0
SUM OF ALL RESPONSES TO PHQ QUESTIONS 1-9: 0

## 2024-04-12 ASSESSMENT — LIFESTYLE VARIABLES: HOW OFTEN DO YOU HAVE A DRINK CONTAINING ALCOHOL: MONTHLY OR LESS

## 2024-04-12 ASSESSMENT — PAIN - FUNCTIONAL ASSESSMENT: PAIN_FUNCTIONAL_ASSESSMENT: 0-10

## 2024-04-12 NOTE — PROGRESS NOTES
Pt complains of no relief of pain. 10/10 all over, percocet given.  Pt swearing, states \"Im getting out of here no matter what.\" Contact, Rod, notified, states \"I'm not coming to get him\" notified manager and provider. Provider states not able to dc. Manager states if pt is leaving AMA, we can use EMS for transport.  Pt resting in bed at this time. No mention of leaving AMA at this time.

## 2024-04-12 NOTE — FLOWSHEET NOTE
04/12/24 1556   Assessment   Charting Type Admission   Psychosocial   Psychosocial (WDL) WDL   Neurological   Neuro (WDL) X   Level of Consciousness 0   Orientation Level Oriented to person;Oriented to place;Oriented to situation;Disoriented to time   Cognition Follows commands;Poor judgement;Poor safety awareness   Speech Delayed responses;Slurred   R Hand  Moderate   L Hand  Moderate   R Foot Dorsiflexion Amputation   L Foot Dorsiflexion Amputation   R Foot Plantar Flexion Amputation   L Foot Plantar Flexion Amputation   Neligh Coma Scale   Eye Opening 4   Best Verbal Response 4   Best Motor Response 6   Lor Coma Scale Score 14   HEENT (Head, Ears, Eyes, Nose, & Throat)   HEENT (WDL) X   Teeth Dentures upper;Dentures lower   Respiratory   Respiratory (WDL) X   Respiratory Interventions Cough & deep breathe;H.O.B. elevated   Respiratory Pattern Regular   Respiratory Depth Shallow   Respiratory Quality/Effort Dyspnea with exertion   L Breath Sounds Diminished   R Breath Sounds Diminished   Breath Sounds   Right Upper Lobe Diminished   Right Middle Lobe Diminished   Right Lower Lobe Diminished   Left Upper Lobe Diminished   Left Lower Lobe Diminished   Cough/Sputum   Cough Non-productive;Strong   Frequency Occasional   Sputum Amount None   Cardiac   Cardiac (WDL) WDL   Gastrointestinal   Abdominal (WDL) WDL   Last BM (including prior to admit)   (pt unable to give date of last bm)   Genitourinary   Genitourinary (WDL) X  (inc at times)   Peripheral Vascular   Peripheral Vascular (WDL) WDL   Dual Clinician Skin Assessment   Dual Skin Assessment (4 Eyes) X   Second Clinical  (First and Last Name) Danna Millan   Skin Integumentary    Skin Integumentary (WDL) X   Skin Color Pale   Skin Condition/Temp Cool;Dry   Skin Integrity Excoriation;Other (comment)  (scabs)   Location scattered   Multiple Skin Integrity Sites Yes   Skin Integrity Site 2   Skin Integrity Location 2 Ecchymosis   Location 2 Lt

## 2024-04-12 NOTE — PLAN OF CARE
Problem: Discharge Planning  Goal: Discharge to home or other facility with appropriate resources  Outcome: Progressing  Flowsheets (Taken 4/12/2024 1556)  Discharge to home or other facility with appropriate resources: Identify barriers to discharge with patient and caregiver     Problem: Pain  Goal: Verbalizes/displays adequate comfort level or baseline comfort level  Outcome: Progressing     Problem: Chronic Conditions and Co-morbidities  Goal: Patient's chronic conditions and co-morbidity symptoms are monitored and maintained or improved  Outcome: Progressing  Flowsheets (Taken 4/12/2024 1556)  Care Plan - Patient's Chronic Conditions and Co-Morbidity Symptoms are Monitored and Maintained or Improved:   Monitor and assess patient's chronic conditions and comorbid symptoms for stability, deterioration, or improvement   Collaborate with multidisciplinary team to address chronic and comorbid conditions and prevent exacerbation or deterioration   Update acute care plan with appropriate goals if chronic or comorbid symptoms are exacerbated and prevent overall improvement and discharge     Problem: Safety - Adult  Goal: Free from fall injury  Outcome: Progressing     Problem: Skin/Tissue Integrity  Goal: Absence of new skin breakdown  Description: 1.  Monitor for areas of redness and/or skin breakdown  2.  Assess vascular access sites hourly  3.  Every 4-6 hours minimum:  Change oxygen saturation probe site  4.  Every 4-6 hours:  If on nasal continuous positive airway pressure, respiratory therapy assess nares and determine need for appliance change or resting period.  Outcome: Progressing     Problem: Confusion  Goal: Confusion, delirium, dementia, or psychosis is improved or at baseline  Description: INTERVENTIONS:  1. Assess for possible contributors to thought disturbance, including medications, impaired vision or hearing, underlying metabolic abnormalities, dehydration, psychiatric diagnoses, and notify
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
## 2024-04-12 NOTE — PROGRESS NOTES
Medication Reconciliation completed with fill history from [facility] in [place] and Patient interview. Patient stated he was in too much pain to be able to think about his medications. I did persist and have him confirm his insulin.  Not Taking:  -Methadone 5 mg  -Hydroxyzine Pamoate 25 mg  -Pregabalin 50 mg    Added:  -Lantus Solostar 15 Units qd  -Humalog Kwikpen Sliding Scale  -Atenolol 25 mg qd  -Gabitril 16 mg bid wf. Patient was unable to recognize medication by name. Last filled 4/9/2024 30 ds  -Sildenafil 25 mg 4t po qd prn last filled 3/25/2024 for #30 7 day supply. Patient was unable to recognize medication by name.   -Ramipril 5 mg qd was unable to recognize name, but last filled 3/25/2024.  -Patient last filled Dexcom sensor on 4/11/2024.

## 2024-04-12 NOTE — CARE COORDINATION
Attempted OT evaluation.  Patient declined stating that he was in too much pain.  Patient rates pain 10/10.  Nurse, Renata notified of patient's pain.  Will re-attempt evaluation as patient is able to participate.

## 2024-04-12 NOTE — PROGRESS NOTES
4 Eyes Skin Assessment     NAME:  Ronald Khan  YOB: 1970  MEDICAL RECORD NUMBER:  [id number]    The patient is being assessed for  Admission    I agree that at least one RN has performed a thorough Head to Toe Skin Assessment on the patient. ALL assessment sites listed below have been assessed.      Areas assessed by both nurses:    Head, Face, Ears, Shoulders, Back, Chest, Arms, Elbows, Hands, Sacrum. Buttock, Coccyx, Ischium, and Legs. Feet and Heels        Does the Patient have a Wound? Yes wound(s) were present on assessment. LDA wound assessment was Initiated and completed by RN       David Prevention initiated by RN: Yes  Wound Care Orders initiated by RN: No    Pressure Injury (Stage 3,4, Unstageable, DTI, NWPT, and Complex wounds) if present, place Wound referral order by RN under : No    New Ostomies, if present place, Ostomy referral order under : No     Nurse 1 eSignature: Electronically signed by Renata Jefferson RN on 4/12/24 at 12:10 PM EDT    **SHARE this note so that the co-signing nurse can place an eSignature**    Nurse 2 eSignature: Electronically signed by Danna Millan RN on 4/12/24 at 12:24 PM EDT

## 2024-04-13 VITALS
WEIGHT: 155 LBS | HEART RATE: 95 BPM | HEIGHT: 67 IN | SYSTOLIC BLOOD PRESSURE: 102 MMHG | BODY MASS INDEX: 24.33 KG/M2 | OXYGEN SATURATION: 96 % | DIASTOLIC BLOOD PRESSURE: 48 MMHG | TEMPERATURE: 99 F | RESPIRATION RATE: 20 BRPM

## 2024-04-13 PROBLEM — I95.9 HYPOTENSION: Status: ACTIVE | Noted: 2024-04-13
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
## 2024-04-13 PROBLEM — I25.10 CORONARY ARTERY DISEASE INVOLVING NATIVE CORONARY ARTERY OF NATIVE HEART WITHOUT ANGINA PECTORIS: Status: ACTIVE | Noted: 2024-04-13

## 2024-04-13 PROBLEM — K59.00 CONSTIPATION: Status: ACTIVE | Noted: 2024-04-13

## 2024-04-13 PROBLEM — R74.01 TRANSAMINITIS: Status: ACTIVE | Noted: 2024-04-13

## 2024-04-13 LAB
ALBUMIN SERPL-MCNC: 2.9 G/DL (ref 3.4–4.8)
ALBUMIN/GLOB SERPL: 1.2 {RATIO} (ref 0.8–2)
ALP SERPL-CCNC: 98 U/L (ref 25–100)
ALT SERPL-CCNC: 284 U/L (ref 4–36)
ANION GAP SERPL CALCULATED.3IONS-SCNC: 10 MMOL/L (ref 3–16)
AST SERPL-CCNC: 427 U/L (ref 8–33)
BACTERIA UR CULT: NORMAL
BASOPHILS # BLD: 0 K/UL (ref 0–0.1)
BASOPHILS NFR BLD: 0.3 %
BILIRUB SERPL-MCNC: 0.6 MG/DL (ref 0.3–1.2)
BUN SERPL-MCNC: 24 MG/DL (ref 6–20)
CALCIUM SERPL-MCNC: 8.1 MG/DL (ref 8.5–10.5)
CHLORIDE SERPL-SCNC: 107 MMOL/L (ref 98–107)
CK SERPL-CCNC: 805 U/L (ref 26–174)
CO2 SERPL-SCNC: 21 MMOL/L (ref 20–30)
CREAT SERPL-MCNC: 1.1 MG/DL (ref 0.4–1.2)
EOSINOPHIL # BLD: 0.1 K/UL (ref 0–0.4)
EOSINOPHIL NFR BLD: 1.7 %
ERYTHROCYTE [DISTWIDTH] IN BLOOD BY AUTOMATED COUNT: 13.2 % (ref 11–16)
ETHANOLAMINE SERPL-MCNC: NORMAL MG/DL (ref 0–0.08)
GFR SERPLBLD CREATININE-BSD FMLA CKD-EPI: 79 ML/MIN/{1.73_M2}
GLOBULIN SER CALC-MCNC: 2.5 G/DL
GLUCOSE BLD-MCNC: 86 MG/DL (ref 74–106)
GLUCOSE SERPL-MCNC: 100 MG/DL (ref 74–106)
HCT VFR BLD AUTO: 38.5 % (ref 40–54)
HGB BLD-MCNC: 12.9 G/DL (ref 13–18)
IMM GRANULOCYTES # BLD: 0 K/UL
IMM GRANULOCYTES NFR BLD: 0.5 % (ref 0–5)
LYMPHOCYTES # BLD: 2.5 K/UL (ref 1.5–4)
LYMPHOCYTES NFR BLD: 32 %
MAGNESIUM SERPL-MCNC: 1.9 MG/DL (ref 1.7–2.4)
MCH RBC QN AUTO: 31.5 PG (ref 27–32)
MCHC RBC AUTO-ENTMCNC: 33.5 G/DL (ref 31–35)
MCV RBC AUTO: 94.1 FL (ref 80–100)
MONOCYTES # BLD: 1.1 K/UL (ref 0.2–0.8)
MONOCYTES NFR BLD: 14.7 %
NEUTROPHILS # BLD: 3.9 K/UL (ref 2–7.5)
NEUTS SEG NFR BLD: 50.8 %
PERFORMED ON: NORMAL
PLATELET # BLD AUTO: 198 K/UL (ref 150–400)
PMV BLD AUTO: 9.4 FL (ref 6–10)
POTASSIUM SERPL-SCNC: 3.8 MMOL/L (ref 3.4–5.1)
PROT SERPL-MCNC: 5.4 G/DL (ref 6.4–8.3)
RBC # BLD AUTO: 4.09 M/UL (ref 4.5–6)
SODIUM SERPL-SCNC: 138 MMOL/L (ref 136–145)
WBC # BLD AUTO: 7.7 K/UL (ref 4–11)

## 2024-04-13 PROCEDURE — 82077 ASSAY SPEC XCP UR&BREATH IA: CPT

## 2024-04-13 PROCEDURE — 36415 COLL VENOUS BLD VENIPUNCTURE: CPT

## 2024-04-13 PROCEDURE — 80053 COMPREHEN METABOLIC PANEL: CPT

## 2024-04-13 PROCEDURE — 82550 ASSAY OF CK (CPK): CPT

## 2024-04-13 PROCEDURE — 83735 ASSAY OF MAGNESIUM: CPT

## 2024-04-13 PROCEDURE — 99236 HOSP IP/OBS SAME DATE HI 85: CPT | Performed by: PHYSICIAN ASSISTANT

## 2024-04-13 PROCEDURE — 6370000000 HC RX 637 (ALT 250 FOR IP): Performed by: PHYSICIAN ASSISTANT

## 2024-04-13 PROCEDURE — 85025 COMPLETE CBC W/AUTO DIFF WBC: CPT

## 2024-04-13 PROCEDURE — 80074 ACUTE HEPATITIS PANEL: CPT

## 2024-04-13 RX ORDER — SENNA AND DOCUSATE SODIUM 50; 8.6 MG/1; MG/1
1 TABLET, FILM COATED ORAL 2 TIMES DAILY
Qty: 60 TABLET | Refills: 0 | Status: SHIPPED | OUTPATIENT
Start: 2024-04-13

## 2024-04-13 RX ORDER — GABAPENTIN 100 MG/1
100 CAPSULE ORAL 3 TIMES DAILY
Qty: 15 CAPSULE | Refills: 0 | Status: SHIPPED | OUTPATIENT
Start: 2024-04-13 | End: 2024-04-18

## 2024-04-13 RX ORDER — POLYETHYLENE GLYCOL 3350 17 G/17G
17 POWDER, FOR SOLUTION ORAL DAILY
Qty: 527 G | Refills: 0 | Status: SHIPPED | OUTPATIENT
Start: 2024-04-14 | End: 2024-05-14

## 2024-04-13 RX ORDER — BUDESONIDE 0.5 MG/2ML
0.5 INHALANT ORAL
Status: DISCONTINUED | OUTPATIENT
Start: 2024-04-13 | End: 2024-04-13 | Stop reason: HOSPADM

## 2024-04-13 RX ORDER — LIDOCAINE 4 G/G
1 PATCH TOPICAL DAILY
Qty: 30 EACH | Refills: 0 | Status: SHIPPED | OUTPATIENT
Start: 2024-04-14 | End: 2024-05-14

## 2024-04-13 RX ORDER — OXYCODONE AND ACETAMINOPHEN 7.5; 325 MG/1; MG/1
1 TABLET ORAL EVERY 6 HOURS PRN
Qty: 20 TABLET | Refills: 0 | Status: SHIPPED | OUTPATIENT
Start: 2024-04-13 | End: 2024-04-18

## 2024-04-13 RX ADMIN — Medication 324 MG: at 08:51

## 2024-04-13 RX ADMIN — PANTOPRAZOLE SODIUM 40 MG: 40 TABLET, DELAYED RELEASE ORAL at 08:45

## 2024-04-13 RX ADMIN — GABAPENTIN 100 MG: 100 CAPSULE ORAL at 08:45

## 2024-04-13 RX ADMIN — SENNOSIDES AND DOCUSATE SODIUM 1 TABLET: 50; 8.6 TABLET ORAL at 08:46

## 2024-04-13 RX ADMIN — TIZANIDINE 4 MG: 4 TABLET ORAL at 08:45

## 2024-04-13 RX ADMIN — TIZANIDINE 4 MG: 4 TABLET ORAL at 02:12

## 2024-04-13 RX ADMIN — CETIRIZINE HYDROCHLORIDE 10 MG: 10 TABLET, FILM COATED ORAL at 08:44

## 2024-04-13 RX ADMIN — OXYCODONE HYDROCHLORIDE AND ACETAMINOPHEN 500 MG: 500 TABLET ORAL at 08:45

## 2024-04-13 RX ADMIN — OXYCODONE AND ACETAMINOPHEN 2 TABLET: 5; 325 TABLET ORAL at 07:20

## 2024-04-13 RX ADMIN — ASPIRIN 81 MG 81 MG: 81 TABLET ORAL at 08:46

## 2024-04-13 RX ADMIN — POLYETHYLENE GLYCOL 3350 17 G: 17 POWDER, FOR SOLUTION ORAL at 08:48

## 2024-04-13 ASSESSMENT — PAIN SCALES - GENERAL
PAINLEVEL_OUTOF10: 10

## 2024-04-13 ASSESSMENT — PAIN DESCRIPTION - ORIENTATION: ORIENTATION: RIGHT;LEFT

## 2024-04-13 ASSESSMENT — PAIN DESCRIPTION - LOCATION
LOCATION: RIB CAGE
LOCATION: RIB CAGE

## 2024-04-13 NOTE — PLAN OF CARE
Problem: Discharge Planning  Goal: Discharge to home or other facility with appropriate resources  4/12/2024 1612 by Renata Jefferson RN  Outcome: Progressing  Flowsheets (Taken 4/12/2024 1556)  Discharge to home or other facility with appropriate resources: Identify barriers to discharge with patient and caregiver     Problem: Pain  Goal: Verbalizes/displays adequate comfort level or baseline comfort level  4/13/2024 0042 by Grayson Madden RN  Outcome: Progressing  4/12/2024 1612 by Renata Jefferson RN  Outcome: Progressing     Problem: Chronic Conditions and Co-morbidities  Goal: Patient's chronic conditions and co-morbidity symptoms are monitored and maintained or improved  4/12/2024 1612 by Renata Jefferson RN  Outcome: Progressing  Flowsheets (Taken 4/12/2024 1556)  Care Plan - Patient's Chronic Conditions and Co-Morbidity Symptoms are Monitored and Maintained or Improved:   Monitor and assess patient's chronic conditions and comorbid symptoms for stability, deterioration, or improvement   Collaborate with multidisciplinary team to address chronic and comorbid conditions and prevent exacerbation or deterioration   Update acute care plan with appropriate goals if chronic or comorbid symptoms are exacerbated and prevent overall improvement and discharge     Problem: Safety - Adult  Goal: Free from fall injury  4/13/2024 0042 by Grayson Madden RN  Outcome: Progressing  4/12/2024 1612 by Renata Jefferson RN  Outcome: Progressing     Problem: Skin/Tissue Integrity  Goal: Absence of new skin breakdown  Description: 1.  Monitor for areas of redness and/or skin breakdown  2.  Assess vascular access sites hourly  3.  Every 4-6 hours minimum:  Change oxygen saturation probe site  4.  Every 4-6 hours:  If on nasal continuous positive airway pressure, respiratory therapy assess nares and determine need for appliance change or resting period.  4/13/2024 0042 by Grayson Madden RN  Outcome: Progressing  4/12/2024

## 2024-04-13 NOTE — PROGRESS NOTES
PIV removed.  Discharged home.  Pt and his SO verbalized understanding of discharge instructions.  Aware to  new prescriptions at pharmacy.  Accompanied to exit.

## 2024-04-13 NOTE — FLOWSHEET NOTE
04/12/24 2200   Assessment   Charting Type Shift assessment   Psychosocial   Psychosocial (WDL) X   Patient Behaviors Aggressive verbally, others;Agitated;Angry;Verbal   Neurological   Neuro (WDL) X   Level of Consciousness 0   Orientation Level Oriented to person;Oriented to place;Oriented to situation;Disoriented to time   Cognition Follows commands;Poor judgement;Poor safety awareness   Speech Delayed responses;Slurred   R Hand  Moderate   L Hand  Moderate   R Foot Dorsiflexion Amputation   L Foot Dorsiflexion Amputation   R Foot Plantar Flexion Amputation   L Foot Plantar Flexion Amputation   Lor Coma Scale   Eye Opening 4   Best Verbal Response 5   Best Motor Response 6   Lor Coma Scale Score 15   HEENT (Head, Ears, Eyes, Nose, & Throat)   HEENT (WDL) X   Teeth Dentures upper;Dentures lower   Respiratory   Respiratory (WDL) X   Respiratory Pattern Regular   Respiratory Depth Shallow   Respiratory Quality/Effort Dyspnea with exertion   L Breath Sounds Diminished   R Breath Sounds Diminished   Breath Sounds   Right Upper Lobe Diminished   Right Middle Lobe Diminished   Right Lower Lobe Diminished   Left Upper Lobe Diminished   Left Lower Lobe Diminished   Cough/Sputum   Cough Non-productive;Strong   Sputum Amount None   Cardiac   Cardiac (WDL) WDL   Gastrointestinal   Abdominal (WDL) WDL   Genitourinary   Genitourinary (WDL) X  (inc at times)   Peripheral Vascular   Peripheral Vascular (WDL) WDL   Skin Integumentary    Skin Integumentary (WDL) X   Skin Color Pale   Skin Condition/Temp Dry   Skin Integrity Ecchymosis;Other (comment)  (scabs)   Location Scattered   Skin Integrity Site 2   Skin Integrity Location 2 Ecchymosis   Location 2 L Thigh   Preventative Dressing No   Assessed this shift Yes   Skin Integrity Site 3   Skin Integrity Location 3 Other (comment)  (scab)    Location 3 L leg stump   Preventative Dressing No   Assessed this shift Yes   Musculoskeletal   Musculoskeletal (WDL) X

## 2024-04-13 NOTE — DISCHARGE INSTRUCTIONS
Disposition: home with home health referral     Discharged Condition: Stable    Activity: activity as tolerated    Diet: diabetic diet, cardiac diet     Follow Up: Primary Care Provider in 3-5 days    Patient to proceed with the following labs (cbc, cmp) on Thursday 4/18    Do not restart your statin, atenolol, farxiga, or ramipril until your PCP directs you to do so

## 2024-04-13 NOTE — PROGRESS NOTES
Nurse waived arms and requested privacy to Virtual  multiple times to change patient and no response from [facility]. Covered camera to obscure view while staff were working to change patient. Uncovered camera once finished. Never received a response from [facility] or a call to the nurses station. Duration of 15 minutes of camera not having view of the patient during changing.

## 2024-04-13 NOTE — FLOWSHEET NOTE
04/13/24 0835   Assessment   Charting Type Shift assessment   Psychosocial   Psychosocial (WDL) WDL   Neurological   Neuro (WDL) X   Level of Consciousness 0   Orientation Level Oriented to person;Oriented to place;Oriented to situation;Disoriented to time   Cognition Follows commands;Poor judgement;Poor safety awareness   Speech Clear   R Hand  Moderate   L Hand  Moderate   R Foot Dorsiflexion Amputation   L Foot Dorsiflexion Amputation   R Foot Plantar Flexion Amputation   L Foot Plantar Flexion Amputation   [place] Coma Scale   Eye Opening 4   Best Verbal Response 4   Best Motor Response 6   [place] Coma Scale Score 14   HEENT (Head, Ears, Eyes, Nose, & Throat)   HEENT (WDL) X   Teeth Dentures upper;Dentures lower   Respiratory   Respiratory (WDL) X   Respiratory Pattern Regular   Respiratory Depth Shallow   Respiratory Quality/Effort Dyspnea with exertion   L Breath Sounds Diminished   R Breath Sounds Diminished   Breath Sounds   Right Upper Lobe Diminished   Right Middle Lobe Diminished   Right Lower Lobe Diminished   Left Upper Lobe Diminished   Left Lower Lobe Diminished   Cough/Sputum   Cough Non-productive;Strong   Sputum Amount None   Cardiac   Cardiac (WDL) WDL   Gastrointestinal   Abdominal (WDL) WDL   Genitourinary   Genitourinary (WDL) X  (inc at times)   Peripheral Vascular   Peripheral Vascular (WDL) WDL   Skin Integumentary    Skin Integumentary (WDL) X   Skin Color Pale   Skin Condition/Temp Dry   Skin Integrity Ecchymosis;Other (comment)  (scabs)   Location scattered   Skin Integrity Site 2   Skin Integrity Location 2 Ecchymosis   Location 2 lef thigh   Preventative Dressing No   Assessed this shift Yes   Skin Integrity Site 3   Skin Integrity Location 3 Other (comment)  (scab)    Location 3 left leg stump   Preventative Dressing No   Assessed this shift Yes   Skin Integrity Site 4   Skin Integrity Location 4 Other (comment)  (scabs)   Location 4 bue   Preventative Dressing No   Skin
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
## 2024-04-13 NOTE — PLAN OF CARE
Problem: Discharge Planning  Goal: Discharge to home or other facility with appropriate resources  Outcome: Completed     Problem: Pain  Goal: Verbalizes/displays adequate comfort level or baseline comfort level  4/13/2024 0959 by Thi Navarro RN  Outcome: Completed  4/13/2024 0042 by Grayson Madden RN  Outcome: Progressing     Problem: Chronic Conditions and Co-morbidities  Goal: Patient's chronic conditions and co-morbidity symptoms are monitored and maintained or improved  Outcome: Completed     Problem: Safety - Adult  Goal: Free from fall injury  4/13/2024 0959 by Thi Navarro RN  Outcome: Completed  4/13/2024 0042 by Grayson Madden RN  Outcome: Progressing     Problem: Skin/Tissue Integrity  Goal: Absence of new skin breakdown  Description: 1.  Monitor for areas of redness and/or skin breakdown  2.  Assess vascular access sites hourly  3.  Every 4-6 hours minimum:  Change oxygen saturation probe site  4.  Every 4-6 hours:  If on nasal continuous positive airway pressure, respiratory therapy assess nares and determine need for appliance change or resting period.  4/13/2024 0959 by Thi Navarro RN  Outcome: Completed  4/13/2024 0042 by Grayson Madden RN  Outcome: Progressing     Problem: Confusion  Goal: Confusion, delirium, dementia, or psychosis is improved or at baseline  Description: INTERVENTIONS:  1. Assess for possible contributors to thought disturbance, including medications, impaired vision or hearing, underlying metabolic abnormalities, dehydration, psychiatric diagnoses, and notify attending LIP  2. [place] high risk fall precautions, as indicated  3. Provide frequent short contacts to provide reality reorientation, refocusing and direction  4. Decrease environmental stimuli, including noise as appropriate  5. Monitor and intervene to maintain adequate nutrition, hydration, elimination, sleep and activity  6. If unable to ensure safety without constant
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
## 2024-04-13 NOTE — PROGRESS NOTES
Message left for Ilene [facility] to call [facility] for new referral on Pt.

## 2024-04-14 LAB
HAV IGM SERPL QL IA: ABNORMAL
HBV CORE IGM SERPL QL IA: ABNORMAL
HBV SURFACE AG SERPL QL IA: ABNORMAL
HCV AB SERPL QL IA: REACTIVE

## 2024-04-15 ENCOUNTER — CARE COORDINATION (OUTPATIENT)
Dept: PRIMARY CARE CLINIC | Age: 54
End: 2024-04-15

## 2024-04-15 NOTE — CARE COORDINATION
Left voice mail telling patient of a follow-up with Dr Lobo 4/18/2024 at 10:30 AM. Also told patient to call if he wanted appointment or doctor changed.

## 2024-04-15 NOTE — CARE COORDINATION
Care Transitions Initial Follow Up Call    Call within 2 business days of discharge: Yes     Patient: Ronald Khan Patient : 1970 MRN: [id number]    Last Discharge Facility       Date Complaint Diagnosis Description Type Department Provider    24 Back Pain ZORAIDA (acute kidney injury) (HCC) ... ED to Hosp-Admission (Discharged) (ADMITTED) [facility] Jose Ramon Hastings MD; Ronald Salinas...            RARS: Readmission Risk Score: 14.3       Spoke with: Attempting HFU call, unsuccessful.  Message left with contact information.    Discharge department/facility: [facility]    Non-face-to-face services provided:  Scheduled appointment with PCP-Lashell  Obtained and reviewed discharge summary and/or continuity of care documents    Follow Up  Future Appointments   Date Time Provider Department Center   2024 10:30 AM Jose Ramon Lobo MD [facility]       Saleem Reyes RN

## 2024-04-17 NOTE — CARE COORDINATION
Care Transitions Initial Follow Up Call    Call within 2 business days of discharge: Yes     Patient: Ronald Khan Patient : 1970 MRN: [id number]    Last Discharge Facility       Date Complaint Diagnosis Description Type Department Provider    24 Back Pain ZORAIDA (acute kidney injury) (HCC) ... ED to Hosp-Admission (Discharged) (ADMITTED) Jose Ramon Stephens MS, MD; Ronald Salinas...            RARS: Readmission Risk Score: 14.3       Spoke with: Attempting to contact Ronald for HFU call, unsuccessful.  Message left with contact information.     Discharge department/facility: [facility]    Non-face-to-face services provided:  Scheduled appointment with PCP-Lashell  Obtained and reviewed discharge summary and/or continuity of care documents    Follow Up  Future Appointments   Date Time Provider Department Center   2024 10:30 AM Jose Ramon Lobo MD [facility]       Saleem Reyes RN    She had horrible numbers :( on her screening. I e-mailed her back. She can call or email to schedule.

## 2024-07-29 ENCOUNTER — HOSPITAL ENCOUNTER (OUTPATIENT)
Dept: PHYSICAL THERAPY | Facility: HOSPITAL | Age: 54
Setting detail: THERAPIES SERIES
Discharge: HOME | End: 2024-07-29
Payer: MEDICAID

## 2024-07-29 PROCEDURE — 97110 THERAPEUTIC EXERCISES: CPT | Performed by: PHYSICAL THERAPIST

## 2024-07-29 PROCEDURE — 97162 PT EVAL MOD COMPLEX 30 MIN: CPT | Performed by: PHYSICAL THERAPIST

## 2024-07-29 ASSESSMENT — PAIN DESCRIPTION - ORIENTATION: ORIENTATION: RIGHT;LEFT

## 2024-07-29 ASSESSMENT — PAIN DESCRIPTION - PAIN TYPE: TYPE: CHRONIC PAIN

## 2024-07-29 ASSESSMENT — PAIN DESCRIPTION - LOCATION: LOCATION: HIP;KNEE;BACK

## 2024-07-29 ASSESSMENT — PAIN SCALES - GENERAL: PAINLEVEL_OUTOF10: 8

## 2024-07-31 NOTE — THERAPY EVALUATION
Decreased ADL status, Decreased strength, Decreased endurance, Decreased sensation, Decreased balance, Increased pain    Statement of Medical Necessity: Physical Therapy is both indicated and medically necessary as outlined in the POC to increase the likelihood of meeting the functionally related goals stated below.     Patient's Activity Tolerance: Patient tolerated evaluation without incident, Patient limited by pain, Patient limited by endurance      Patient's rehabilitation potential/prognosis is considered to be: Good    Factors which may impact rehabilitation potential include: Medical co-morbidities        GOALS   Patient Goal(s): Patient's goal for physical therapy is to return to IND walking  Short Term Goals Completed by 4 weeks Goal Status   Patient to be IND with HEP New   Patient to have decreased pain to 5/10 New   Patient to increase strength in bilateral hips to 4/5 New   Patient to increase safe ambulation with a QC 100ft New                                           Long Term Goals Completed by 8 weeks Goal Status   Patient to increase strength in Bilateral hips to 4+/5 New   Patient to be able to ambulate without an assistive device with SBA 50ft New   Patient to have decreased pain to 2/10 New   Patient to have a score of 40/80 on his LEFS New                                            TREATMENT PLAN       Requires PT Follow-Up: Yes    Pt. actively involved in establishing Plan of Care and Goals: Yes  Patient/ Caregiver education and instruction: Goals, Home Exercise Program, Precautions, General Safety, Transfer Training, Gait Training, Fall prevention strategies, Functional Mobility Training, Plan of Care             Treatment may include any combination of the following: Current Treatment Recommendations: Strengthening, ROM, Transfer training, Gait training, Pain management, Patient/Caregiver education & training, Home exercise program, Positioning     Frequency / Duration:  Patient to be seen

## 2024-10-21 RX ORDER — POLYETHYLENE GLYCOL 3350 17 G/17G
POWDER, FOR SOLUTION ORAL
Qty: 510 G | OUTPATIENT
Start: 2024-10-21

## 2024-11-21 ENCOUNTER — HOSPITAL ENCOUNTER (EMERGENCY)
Facility: HOSPITAL | Age: 54
Discharge: HOME | End: 2024-11-21
Attending: HOSPITALIST
Payer: MEDICAID

## 2024-11-21 VITALS
BODY MASS INDEX: 17.18 KG/M2 | SYSTOLIC BLOOD PRESSURE: 113 MMHG | HEART RATE: 88 BPM | OXYGEN SATURATION: 97 % | WEIGHT: 120 LBS | HEIGHT: 70 IN | TEMPERATURE: 98.3 F | RESPIRATION RATE: 16 BRPM | DIASTOLIC BLOOD PRESSURE: 67 MMHG

## 2024-11-21 DIAGNOSIS — L97.109 ULCERATION OF STUMP OF ABOVE KNEE AMPUTATION (HCC): Primary | ICD-10-CM

## 2024-11-21 DIAGNOSIS — T87.89 ULCERATION OF STUMP OF ABOVE KNEE AMPUTATION (HCC): Primary | ICD-10-CM

## 2024-11-21 PROCEDURE — 99283 EMERGENCY DEPT VISIT LOW MDM: CPT

## 2024-11-21 RX ORDER — CLINDAMYCIN HYDROCHLORIDE 300 MG/1
300 CAPSULE ORAL 3 TIMES DAILY
Qty: 30 CAPSULE | Refills: 0 | Status: SHIPPED | OUTPATIENT
Start: 2024-11-21 | End: 2024-12-01

## 2024-11-21 RX ORDER — CLINDAMYCIN HYDROCHLORIDE 300 MG/1
300 CAPSULE ORAL 3 TIMES DAILY
Qty: 30 CAPSULE | Refills: 0 | Status: SHIPPED | OUTPATIENT
Start: 2024-11-21 | End: 2024-11-21

## 2024-11-21 ASSESSMENT — PAIN SCALES - GENERAL: PAINLEVEL_OUTOF10: 7

## 2024-11-21 ASSESSMENT — PAIN DESCRIPTION - FREQUENCY: FREQUENCY: CONTINUOUS

## 2024-11-21 ASSESSMENT — PAIN DESCRIPTION - LOCATION: LOCATION: LEG

## 2024-11-21 ASSESSMENT — LIFESTYLE VARIABLES
HOW MANY STANDARD DRINKS CONTAINING ALCOHOL DO YOU HAVE ON A TYPICAL DAY: 1 OR 2
HOW OFTEN DO YOU HAVE A DRINK CONTAINING ALCOHOL: MONTHLY OR LESS

## 2024-11-21 ASSESSMENT — PAIN - FUNCTIONAL ASSESSMENT: PAIN_FUNCTIONAL_ASSESSMENT: 0-10

## 2024-11-21 ASSESSMENT — PAIN DESCRIPTION - ORIENTATION: ORIENTATION: RIGHT;LEFT

## 2024-11-21 ASSESSMENT — PAIN DESCRIPTION - PAIN TYPE: TYPE: ACUTE PAIN
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
## 2024-11-21 NOTE — ED NOTES
Dc instructions given to patient at this time, patient to  rx's from [facility] as selected and follow up with pcp and ortho if no improvement in a week. Patient with no other questions or concerns.

## 2024-11-21 NOTE — ED NOTES
Nonstick dressing in place to bilateral bka stumps at this time, covered with kerlix and secured with tape at this time, patient tolerated well.

## 2025-02-10 ENCOUNTER — OFFICE VISIT (OUTPATIENT)
Age: 55
End: 2025-02-10
Payer: MEDICAID

## 2025-02-10 VITALS
HEART RATE: 66 BPM | DIASTOLIC BLOOD PRESSURE: 79 MMHG | SYSTOLIC BLOOD PRESSURE: 128 MMHG | OXYGEN SATURATION: 96 % | TEMPERATURE: 97.4 F

## 2025-02-10 DIAGNOSIS — L30.9 ACUTE DERMATITIS: Primary | ICD-10-CM

## 2025-02-10 DIAGNOSIS — L98.9 SKIN LESION OF FACE: ICD-10-CM

## 2025-02-10 PROCEDURE — G8427 DOCREV CUR MEDS BY ELIG CLIN: HCPCS | Performed by: NURSE PRACTITIONER

## 2025-02-10 PROCEDURE — 3017F COLORECTAL CA SCREEN DOC REV: CPT | Performed by: NURSE PRACTITIONER

## 2025-02-10 PROCEDURE — 99212 OFFICE O/P EST SF 10 MIN: CPT | Performed by: NURSE PRACTITIONER

## 2025-02-10 PROCEDURE — 1036F TOBACCO NON-USER: CPT | Performed by: NURSE PRACTITIONER

## 2025-02-10 PROCEDURE — G8419 CALC BMI OUT NRM PARAM NOF/U: HCPCS | Performed by: NURSE PRACTITIONER

## 2025-02-10 RX ORDER — LEVETIRACETAM 1000 MG/1
1000 TABLET ORAL EVERY 12 HOURS
COMMUNITY

## 2025-02-10 RX ORDER — MUPIROCIN 20 MG/G
OINTMENT TOPICAL
Qty: 15 G | Refills: 0 | Status: SHIPPED | OUTPATIENT
Start: 2025-02-10 | End: 2025-02-17

## 2025-02-10 RX ORDER — DOXYCYCLINE HYCLATE 100 MG
100 TABLET ORAL 2 TIMES DAILY
Qty: 14 TABLET | Refills: 0 | Status: SHIPPED | OUTPATIENT
Start: 2025-02-10 | End: 2025-02-17
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
## 2025-04-01 ENCOUNTER — OFFICE VISIT (OUTPATIENT)
Age: 55
End: 2025-04-01
Payer: MEDICAID

## 2025-04-01 VITALS
WEIGHT: 142.2 LBS | SYSTOLIC BLOOD PRESSURE: 114 MMHG | BODY MASS INDEX: 20.4 KG/M2 | OXYGEN SATURATION: 94 % | HEART RATE: 59 BPM | DIASTOLIC BLOOD PRESSURE: 75 MMHG

## 2025-04-01 DIAGNOSIS — L98.9 SKIN LESION: Primary | ICD-10-CM

## 2025-04-01 PROCEDURE — 99213 OFFICE O/P EST LOW 20 MIN: CPT | Performed by: PHYSICIAN ASSISTANT

## 2025-04-01 PROCEDURE — G8420 CALC BMI NORM PARAMETERS: HCPCS | Performed by: PHYSICIAN ASSISTANT

## 2025-04-01 PROCEDURE — 3017F COLORECTAL CA SCREEN DOC REV: CPT | Performed by: PHYSICIAN ASSISTANT

## 2025-04-01 PROCEDURE — 1036F TOBACCO NON-USER: CPT | Performed by: PHYSICIAN ASSISTANT

## 2025-04-01 PROCEDURE — G8427 DOCREV CUR MEDS BY ELIG CLIN: HCPCS | Performed by: PHYSICIAN ASSISTANT

## 2025-04-01 RX ORDER — HYDROXYZINE HYDROCHLORIDE 25 MG/1
25 TABLET, FILM COATED ORAL EVERY 8 HOURS PRN
Qty: 30 TABLET | Refills: 0 | Status: SHIPPED | OUTPATIENT
Start: 2025-04-01 | End: 2025-04-11

## 2025-04-01 RX ORDER — MUPIROCIN 20 MG/G
OINTMENT TOPICAL 3 TIMES DAILY
Qty: 30 G | Refills: 0 | Status: SHIPPED | OUTPATIENT
Start: 2025-04-01

## 2025-04-01 RX ORDER — BUPRENORPHINE AND NALOXONE 8; 2 MG/1; MG/1
16 FILM, SOLUBLE BUCCAL; SUBLINGUAL DAILY
COMMUNITY
Start: 2024-05-05

## 2025-04-01 RX ORDER — DOXYCYCLINE HYCLATE 100 MG
100 TABLET ORAL 2 TIMES DAILY
Qty: 20 TABLET | Refills: 0 | Status: SHIPPED | OUTPATIENT
Start: 2025-04-01 | End: 2025-04-11

## 2025-04-01 RX ORDER — FLUTICASONE PROPIONATE AND SALMETEROL 50; 250 UG/1; UG/1
POWDER RESPIRATORY (INHALATION)
COMMUNITY
Start: 2025-03-21

## 2025-04-01 ASSESSMENT — PATIENT HEALTH QUESTIONNAIRE - PHQ9: DEPRESSION UNABLE TO ASSESS: URGENT/EMERGENT SITUATION

## 2025-04-01 ASSESSMENT — ENCOUNTER SYMPTOMS
RESPIRATORY NEGATIVE: 1
GASTROINTESTINAL NEGATIVE: 1

## 2025-04-01 NOTE — PROGRESS NOTES
Subjective:     Ronald Khan is a 54 y.o. male.    Chief Complaint   Patient presents with    Rash    Anxiety       HPI    Pt here with c/o rash and anxiety. He states last week his best friend passes away. He states since then he has been having rash on body. Rash is not itchy or painful. He states it started out as brown spots then turned to open wounds. He had this a few months ago and was give PO and topical abx. He states the rash is getting worse. He states the rash is on his face, arms, trunk. Denies any lesions on legs. Has history of seizures. Last seizure was 1 year ago.     LMP - No LMP for male patient.       Current Outpatient Medications:     doxycycline hyclate (VIBRA-TABS) 100 MG tablet, Take 1 tablet by mouth 2 times daily for 10 days, Disp: 20 tablet, Rfl: 0    mupirocin (BACTROBAN) 2 % ointment, Apply topically 3 times daily Apply topically 3 times daily., Disp: 30 g, Rfl: 0    hydrOXYzine HCl (ATARAX) 25 MG tablet, Take 1 tablet by mouth every 8 hours as needed for Itching, Disp: 30 tablet, Rfl: 0    buprenorphine-naloxone (SUBOXONE) 8-2 MG FILM SL film, Place 2 Film under the tongue daily., Disp: , Rfl:     ADVAIR DISKUS 250-50 MCG/ACT AEPB diskus inhaler, , Disp: , Rfl:     naloxone 4 MG/0.1ML LIQD nasal spray, 1 spray by Nasal route as needed, Disp: , Rfl:     levETIRAcetam (KEPPRA) 1000 MG tablet, Take 1 tablet by mouth in the morning and 1 tablet in the evening., Disp: , Rfl:     insulin glargine (LANTUS SOLOSTAR) 100 UNIT/ML injection pen, Inject 15 Units into the skin nightly Indications: restart at lower dose 10 units nightly, Disp: , Rfl:     atenolol (TENORMIN) 50 MG tablet, Take 1 tablet by mouth daily Indications: DO NOT RESTART until directed to by your pcp, Disp: , Rfl:     Continuous Blood Gluc Sensor (DEXCOM G7 SENSOR) MISC, Use as directed - change every 10 days, Disp: , Rfl:     pantoprazole (PROTONIX) 40 MG tablet, Take 1 tablet by mouth daily, Disp: 30 tablet, Rfl: 2

## 2025-04-21 ENCOUNTER — OFFICE VISIT (OUTPATIENT)
Age: 55
End: 2025-04-21
Payer: MEDICAID

## 2025-04-21 VITALS
SYSTOLIC BLOOD PRESSURE: 126 MMHG | HEART RATE: 67 BPM | BODY MASS INDEX: 20.63 KG/M2 | OXYGEN SATURATION: 98 % | DIASTOLIC BLOOD PRESSURE: 73 MMHG | WEIGHT: 143.8 LBS

## 2025-04-21 DIAGNOSIS — L98.9 SKIN LESIONS: Primary | ICD-10-CM

## 2025-04-21 PROCEDURE — 1036F TOBACCO NON-USER: CPT

## 2025-04-21 PROCEDURE — 99213 OFFICE O/P EST LOW 20 MIN: CPT

## 2025-04-21 PROCEDURE — G8420 CALC BMI NORM PARAMETERS: HCPCS

## 2025-04-21 PROCEDURE — G8427 DOCREV CUR MEDS BY ELIG CLIN: HCPCS

## 2025-04-21 PROCEDURE — 3017F COLORECTAL CA SCREEN DOC REV: CPT

## 2025-04-21 RX ORDER — PREDNISONE 20 MG/1
20 TABLET ORAL 2 TIMES DAILY
Qty: 10 TABLET | Refills: 0 | Status: SHIPPED | OUTPATIENT
Start: 2025-04-21 | End: 2025-04-26

## 2025-04-21 RX ORDER — MUPIROCIN 20 MG/G
OINTMENT TOPICAL 3 TIMES DAILY
Qty: 30 G | Refills: 0 | Status: SHIPPED | OUTPATIENT
Start: 2025-04-21

## 2025-04-21 ASSESSMENT — PATIENT HEALTH QUESTIONNAIRE - PHQ9: DEPRESSION UNABLE TO ASSESS: URGENT/EMERGENT SITUATION

## 2025-04-21 ASSESSMENT — ENCOUNTER SYMPTOMS: SHORTNESS OF BREATH: 0

## 2025-04-21 NOTE — PROGRESS NOTES
21 04/13/2024     04/13/2024    BUN 24 (H) 04/13/2024    CREATININE 1.1 04/13/2024     Lab Results   Component Value Date    WBC 7.7 04/13/2024    HGB 12.9 (L) 04/13/2024    HCT 38.5 (L) 04/13/2024    MCV 94.1 04/13/2024     04/13/2024       Chemistry        Component Value Date/Time     04/13/2024 0425    K 3.8 04/13/2024 0425     04/13/2024 0425    CO2 21 04/13/2024 0425    BUN 24 (H) 04/13/2024 0425    CREATININE 1.1 04/13/2024 0425        Component Value Date/Time    CALCIUM 8.1 (L) 04/13/2024 0425    ALKPHOS 98 04/13/2024 0425     (H) 04/13/2024 0425     (H) 04/13/2024 0425    BILITOT 0.6 04/13/2024 0425          Physical Exam  Vitals and nursing note reviewed.   Constitutional:       Appearance: Normal appearance.   HENT:      Head: Normocephalic and atraumatic.      Nose: Nose normal.      Mouth/Throat:      Mouth: Mucous membranes are moist.      Pharynx: Oropharynx is clear.   Eyes:      Extraocular Movements: Extraocular movements intact.      Conjunctiva/sclera: Conjunctivae normal.      Pupils: Pupils are equal, round, and reactive to light.   Cardiovascular:      Rate and Rhythm: Normal rate and regular rhythm.      Pulses: Normal pulses.      Heart sounds: Normal heart sounds.   Pulmonary:      Effort: Pulmonary effort is normal.      Breath sounds: Normal breath sounds.   Abdominal:      General: Abdomen is flat. Bowel sounds are normal.      Palpations: Abdomen is soft.   Musculoskeletal:         General: Normal range of motion.      Cervical back: Normal range of motion and neck supple.   Skin:     General: Skin is warm and dry.      Capillary Refill: Capillary refill takes less than 2 seconds.      Findings: Lesion and wound present.      Comments: Diffuse skin lesions in multiple stages of healing on upper extremities and face. Erythematous and scabbed over. No pus or drainage from any lesion.   Neurological:      General: No focal deficit present.

## 2025-04-30 ENCOUNTER — OFFICE VISIT (OUTPATIENT)
Age: 55
End: 2025-04-30
Payer: MEDICAID

## 2025-04-30 ENCOUNTER — HOSPITAL ENCOUNTER (OUTPATIENT)
Facility: HOSPITAL | Age: 55
Discharge: HOME | End: 2025-04-30
Payer: MEDICAID

## 2025-04-30 VITALS
BODY MASS INDEX: 20.46 KG/M2 | SYSTOLIC BLOOD PRESSURE: 110 MMHG | DIASTOLIC BLOOD PRESSURE: 68 MMHG | HEART RATE: 56 BPM | RESPIRATION RATE: 18 BRPM | OXYGEN SATURATION: 95 % | WEIGHT: 142.6 LBS | TEMPERATURE: 98.1 F

## 2025-04-30 DIAGNOSIS — B19.20 HEPATITIS C VIRUS INFECTION WITHOUT HEPATIC COMA, UNSPECIFIED CHRONICITY: ICD-10-CM

## 2025-04-30 DIAGNOSIS — T87.89 ULCERATION OF BELOW KNEE AMPUTATION STUMP (HCC): ICD-10-CM

## 2025-04-30 DIAGNOSIS — E78.5 HYPERLIPIDEMIA, UNSPECIFIED HYPERLIPIDEMIA TYPE: ICD-10-CM

## 2025-04-30 DIAGNOSIS — Z89.512 HISTORY OF BELOW-KNEE AMPUTATION OF BOTH LOWER EXTREMITIES (HCC): ICD-10-CM

## 2025-04-30 DIAGNOSIS — E11.9 TYPE 2 DIABETES MELLITUS WITHOUT COMPLICATION, WITHOUT LONG-TERM CURRENT USE OF INSULIN (HCC): Primary | ICD-10-CM

## 2025-04-30 DIAGNOSIS — Z89.511 HISTORY OF BELOW-KNEE AMPUTATION OF BOTH LOWER EXTREMITIES (HCC): ICD-10-CM

## 2025-04-30 DIAGNOSIS — L97.809 ULCERATION OF BELOW KNEE AMPUTATION STUMP (HCC): ICD-10-CM

## 2025-04-30 DIAGNOSIS — Z12.5 SCREENING FOR PROSTATE CANCER: ICD-10-CM

## 2025-04-30 DIAGNOSIS — Z51.81 ENCOUNTER FOR MEDICATION MONITORING: ICD-10-CM

## 2025-04-30 DIAGNOSIS — E11.9 TYPE 2 DIABETES MELLITUS WITHOUT COMPLICATION, WITHOUT LONG-TERM CURRENT USE OF INSULIN (HCC): ICD-10-CM

## 2025-04-30 LAB
25(OH)D3 SERPL-MCNC: 94.8 NG/ML (ref 32–100)
ALBUMIN SERPL-MCNC: 3.7 G/DL (ref 3.4–4.8)
ALBUMIN/GLOB SERPL: 1.2 {RATIO} (ref 0.8–2)
ALP SERPL-CCNC: 82 U/L (ref 25–100)
ALT SERPL-CCNC: 46 U/L (ref 4–36)
ANION GAP SERPL CALCULATED.3IONS-SCNC: 10 MMOL/L (ref 3–16)
AST SERPL-CCNC: 41 U/L (ref 8–33)
BASOPHILS # BLD: 0 K/UL (ref 0–0.1)
BASOPHILS NFR BLD: 0.3 %
BILIRUB SERPL-MCNC: 0.4 MG/DL (ref 0.3–1.2)
BUN SERPL-MCNC: 16 MG/DL (ref 6–20)
CALCIUM SERPL-MCNC: 9.9 MG/DL (ref 8.3–10.6)
CHLORIDE SERPL-SCNC: 100 MMOL/L (ref 98–107)
CHOLEST SERPL-MCNC: 101 MG/DL (ref 0–200)
CO2 SERPL-SCNC: 31 MMOL/L (ref 20–30)
CREAT SERPL-MCNC: 1.1 MG/DL (ref 0.4–1.2)
CREAT UR-MCNC: 57.4 MG/DL (ref 39–259)
EOSINOPHIL # BLD: 0.5 K/UL (ref 0–0.4)
EOSINOPHIL NFR BLD: 4 %
ERYTHROCYTE [DISTWIDTH] IN BLOOD BY AUTOMATED COUNT: 13.7 % (ref 11–16)
FOLATE SERPL-MCNC: 12.2 NG/ML
GFR SERPLBLD CREATININE-BSD FMLA CKD-EPI: 79 ML/MIN/{1.73_M2}
GLOBULIN SER CALC-MCNC: 3.1 G/DL
GLUCOSE SERPL-MCNC: 62 MG/DL (ref 74–106)
HBA1C MFR BLD: 5.4 %
HCT VFR BLD AUTO: 42 % (ref 40–54)
HDLC SERPL-MCNC: 47 MG/DL (ref 40–60)
HGB BLD-MCNC: 12.9 G/DL (ref 13–18)
IMM GRANULOCYTES # BLD: 0.1 K/UL
IMM GRANULOCYTES NFR BLD: 0.7 % (ref 0–5)
LDLC SERPL CALC-MCNC: 37 MG/DL
LYMPHOCYTES # BLD: 4.1 K/UL (ref 1.5–4)
LYMPHOCYTES NFR BLD: 31.5 %
MAGNESIUM SERPL-MCNC: 2.1 MG/DL (ref 1.7–2.4)
MCH RBC QN AUTO: 30 PG (ref 27–32)
MCHC RBC AUTO-ENTMCNC: 30.7 G/DL (ref 31–35)
MCV RBC AUTO: 97.7 FL (ref 80–100)
MICROALBUMIN UR DL<=1MG/L-MCNC: <1.2 MG/DL (ref 0–22)
MICROALBUMIN/CREAT UR: NORMAL MG/G (ref 0–30)
MONOCYTES # BLD: 0.9 K/UL (ref 0.2–0.8)
MONOCYTES NFR BLD: 6.5 %
NEUTROPHILS # BLD: 7.5 K/UL (ref 2–7.5)
NEUTS SEG NFR BLD: 57 %
PLATELET # BLD AUTO: 200 K/UL (ref 150–400)
PMV BLD AUTO: 9.8 FL (ref 6–10)
POTASSIUM SERPL-SCNC: 4.5 MMOL/L (ref 3.4–5.1)
PROT SERPL-MCNC: 6.8 G/DL (ref 6.4–8.3)
PSA SERPL DL<=0.01 NG/ML-MCNC: 0.15 NG/ML (ref 0–4)
RBC # BLD AUTO: 4.3 M/UL (ref 4.5–6)
SODIUM SERPL-SCNC: 141 MMOL/L (ref 136–145)
TRIGL SERPL-MCNC: 83 MG/DL (ref 0–249)
TSH SERPL DL<=0.005 MIU/L-ACNC: 2.19 UIU/ML (ref 0.27–4.2)
VIT B12 SERPL-MCNC: 255 PG/ML (ref 211–911)
VLDLC SERPL CALC-MCNC: 17 MG/DL
WBC # BLD AUTO: 13.2 K/UL (ref 4–11)

## 2025-04-30 PROCEDURE — 80061 LIPID PANEL: CPT

## 2025-04-30 PROCEDURE — 82746 ASSAY OF FOLIC ACID SERUM: CPT

## 2025-04-30 PROCEDURE — G8420 CALC BMI NORM PARAMETERS: HCPCS | Performed by: INTERNAL MEDICINE

## 2025-04-30 PROCEDURE — G8427 DOCREV CUR MEDS BY ELIG CLIN: HCPCS | Performed by: INTERNAL MEDICINE

## 2025-04-30 PROCEDURE — 82043 UR ALBUMIN QUANTITATIVE: CPT

## 2025-04-30 PROCEDURE — 84443 ASSAY THYROID STIM HORMONE: CPT

## 2025-04-30 PROCEDURE — 1036F TOBACCO NON-USER: CPT | Performed by: INTERNAL MEDICINE

## 2025-04-30 PROCEDURE — 80053 COMPREHEN METABOLIC PANEL: CPT

## 2025-04-30 PROCEDURE — 82306 VITAMIN D 25 HYDROXY: CPT

## 2025-04-30 PROCEDURE — 84153 ASSAY OF PSA TOTAL: CPT

## 2025-04-30 PROCEDURE — 82570 ASSAY OF URINE CREATININE: CPT

## 2025-04-30 PROCEDURE — 3017F COLORECTAL CA SCREEN DOC REV: CPT | Performed by: INTERNAL MEDICINE

## 2025-04-30 PROCEDURE — 83036 HEMOGLOBIN GLYCOSYLATED A1C: CPT

## 2025-04-30 PROCEDURE — 99214 OFFICE O/P EST MOD 30 MIN: CPT | Performed by: INTERNAL MEDICINE

## 2025-04-30 PROCEDURE — 82607 VITAMIN B-12: CPT

## 2025-04-30 PROCEDURE — 2022F DILAT RTA XM EVC RTNOPTHY: CPT | Performed by: INTERNAL MEDICINE

## 2025-04-30 PROCEDURE — 83735 ASSAY OF MAGNESIUM: CPT

## 2025-04-30 PROCEDURE — 3046F HEMOGLOBIN A1C LEVEL >9.0%: CPT | Performed by: INTERNAL MEDICINE

## 2025-04-30 PROCEDURE — 85025 COMPLETE CBC W/AUTO DIFF WBC: CPT

## 2025-04-30 RX ORDER — ACYCLOVIR 400 MG/1
TABLET ORAL
Qty: 1 EACH | Refills: 0 | Status: SHIPPED | OUTPATIENT
Start: 2025-04-30

## 2025-04-30 RX ORDER — ACYCLOVIR 400 MG/1
TABLET ORAL
Qty: 3 EACH | Refills: 1 | Status: SHIPPED | OUTPATIENT
Start: 2025-04-30

## 2025-04-30 SDOH — ECONOMIC STABILITY: FOOD INSECURITY: WITHIN THE PAST 12 MONTHS, THE FOOD YOU BOUGHT JUST DIDN'T LAST AND YOU DIDN'T HAVE MONEY TO GET MORE.: NEVER TRUE

## 2025-04-30 SDOH — ECONOMIC STABILITY: FOOD INSECURITY: WITHIN THE PAST 12 MONTHS, YOU WORRIED THAT YOUR FOOD WOULD RUN OUT BEFORE YOU GOT MONEY TO BUY MORE.: NEVER TRUE

## 2025-04-30 ASSESSMENT — ENCOUNTER SYMPTOMS
NAUSEA: 0
WHEEZING: 0
SINUS PRESSURE: 0
VOMITING: 0
SHORTNESS OF BREATH: 0
COUGH: 0
EYE DISCHARGE: 0
ABDOMINAL PAIN: 0
BACK PAIN: 0

## 2025-04-30 ASSESSMENT — PATIENT HEALTH QUESTIONNAIRE - PHQ9
7. TROUBLE CONCENTRATING ON THINGS, SUCH AS READING THE NEWSPAPER OR WATCHING TELEVISION: SEVERAL DAYS
SUM OF ALL RESPONSES TO PHQ QUESTIONS 1-9: 10
SUM OF ALL RESPONSES TO PHQ QUESTIONS 1-9: 10
4. FEELING TIRED OR HAVING LITTLE ENERGY: SEVERAL DAYS
10. IF YOU CHECKED OFF ANY PROBLEMS, HOW DIFFICULT HAVE THESE PROBLEMS MADE IT FOR YOU TO DO YOUR WORK, TAKE CARE OF THINGS AT HOME, OR GET ALONG WITH OTHER PEOPLE: SOMEWHAT DIFFICULT
9. THOUGHTS THAT YOU WOULD BE BETTER OFF DEAD, OR OF HURTING YOURSELF: NOT AT ALL
8. MOVING OR SPEAKING SO SLOWLY THAT OTHER PEOPLE COULD HAVE NOTICED. OR THE OPPOSITE, BEING SO FIGETY OR RESTLESS THAT YOU HAVE BEEN MOVING AROUND A LOT MORE THAN USUAL: SEVERAL DAYS
3. TROUBLE FALLING OR STAYING ASLEEP: SEVERAL DAYS
SUM OF ALL RESPONSES TO PHQ QUESTIONS 1-9: 10
5. POOR APPETITE OR OVEREATING: SEVERAL DAYS
1. LITTLE INTEREST OR PLEASURE IN DOING THINGS: MORE THAN HALF THE DAYS
6. FEELING BAD ABOUT YOURSELF - OR THAT YOU ARE A FAILURE OR HAVE LET YOURSELF OR YOUR FAMILY DOWN: SEVERAL DAYS
SUM OF ALL RESPONSES TO PHQ QUESTIONS 1-9: 10
2. FEELING DOWN, DEPRESSED OR HOPELESS: MORE THAN HALF THE DAYS

## 2025-04-30 NOTE — PROGRESS NOTES
3 Encounters:   04/30/25 110/68   04/21/25 126/73   04/01/25 114/75       /68   Pulse 56   Temp 98.1 °F (36.7 °C)   Resp 18   Wt 64.7 kg (142 lb 9.6 oz) Comment: prostesis legs bilateral  SpO2 95%   BMI 20.46 kg/m²      Physical Exam  Vitals and nursing note reviewed.   Constitutional:       Appearance: Normal appearance. He is well-developed.   HENT:      Head: Normocephalic and atraumatic.      Right Ear: External ear normal.      Left Ear: External ear normal.      Nose: Nose normal.      Mouth/Throat:      Mouth: Mucous membranes are moist.      Pharynx: Oropharynx is clear.   Eyes:      Conjunctiva/sclera: Conjunctivae normal.      Pupils: Pupils are equal, round, and reactive to light.   Neck:      Thyroid: No thyromegaly.      Vascular: No JVD.   Cardiovascular:      Rate and Rhythm: Normal rate and regular rhythm.      Heart sounds: Normal heart sounds. No murmur heard.  Pulmonary:      Effort: Pulmonary effort is normal.      Breath sounds: Normal breath sounds. No wheezing or rales.   Abdominal:      General: Bowel sounds are normal.      Palpations: Abdomen is soft.      Tenderness: There is no abdominal tenderness.   Musculoskeletal:         General: No tenderness.      Cervical back: Neck supple. No rigidity. No muscular tenderness.      Right lower leg: No edema.      Left lower leg: No edema.      Comments: Status post bilateral below-knee amputation.  Wearing prosthesis   Skin:     Findings: No erythema or rash.      Comments: 1 x 2 cm superficial ulceration to the left stump with minimal surrounding faint erythema.  No drainage noted but there was minimal serous drainage on the dressing.   Neurological:      General: No focal deficit present.      Mental Status: He is alert and oriented to person, place, and time.   Psychiatric:         Behavior: Behavior normal.         Judgment: Judgment normal.         Lab Results   Component Value Date/Time     04/13/2024 04:25 AM    K 3.8

## 2025-05-08 ENCOUNTER — RESULTS FOLLOW-UP (OUTPATIENT)
Dept: INTERNAL MEDICINE CLINIC | Facility: HOSPITAL | Age: 55
End: 2025-05-08

## 2025-05-08 RX ORDER — LANOLIN ALCOHOL/MO/W.PET/CERES
1000 CREAM (GRAM) TOPICAL DAILY
Qty: 30 TABLET | Refills: 3 | Status: SHIPPED | OUTPATIENT
Start: 2025-05-08